# Patient Record
Sex: MALE | Race: WHITE | NOT HISPANIC OR LATINO | Employment: OTHER | URBAN - METROPOLITAN AREA
[De-identification: names, ages, dates, MRNs, and addresses within clinical notes are randomized per-mention and may not be internally consistent; named-entity substitution may affect disease eponyms.]

---

## 2017-01-16 ENCOUNTER — OFFICE VISIT (OUTPATIENT)
Dept: UROLOGY | Facility: CLINIC | Age: 72
End: 2017-01-16
Payer: MEDICARE

## 2017-01-16 VITALS
HEIGHT: 69 IN | DIASTOLIC BLOOD PRESSURE: 85 MMHG | TEMPERATURE: 98 F | WEIGHT: 163 LBS | SYSTOLIC BLOOD PRESSURE: 131 MMHG | HEART RATE: 81 BPM | BODY MASS INDEX: 24.14 KG/M2

## 2017-01-16 DIAGNOSIS — C61 PROSTATE CANCER: ICD-10-CM

## 2017-01-16 PROCEDURE — 1126F AMNT PAIN NOTED NONE PRSNT: CPT | Mod: S$GLB,,, | Performed by: UROLOGY

## 2017-01-16 PROCEDURE — 1160F RVW MEDS BY RX/DR IN RCRD: CPT | Mod: S$GLB,,, | Performed by: UROLOGY

## 2017-01-16 PROCEDURE — 99999 PR PBB SHADOW E&M-EST. PATIENT-LVL III: CPT | Mod: PBBFAC,,, | Performed by: UROLOGY

## 2017-01-16 PROCEDURE — 1157F ADVNC CARE PLAN IN RCRD: CPT | Mod: S$GLB,,, | Performed by: UROLOGY

## 2017-01-16 PROCEDURE — 99214 OFFICE O/P EST MOD 30 MIN: CPT | Mod: 25,S$GLB,, | Performed by: UROLOGY

## 2017-01-16 PROCEDURE — 1159F MED LIST DOCD IN RCRD: CPT | Mod: S$GLB,,, | Performed by: UROLOGY

## 2017-01-16 PROCEDURE — 81002 URINALYSIS NONAUTO W/O SCOPE: CPT | Mod: S$GLB,,, | Performed by: UROLOGY

## 2017-01-16 RX ORDER — MAGNESIUM HYDROXIDE 400 MG/5ML
1 SUSPENSION, ORAL (FINAL DOSE FORM) ORAL WEEKLY
COMMUNITY

## 2017-01-16 NOTE — PROGRESS NOTES
Subjective:       Patient ID: Charles Perez is a 71 y.o. male.    Chief Complaint:   CHIEF COMPLAINT:  Prostate cancer.    Mr. Perez is a 71-year-old male who was diagnosed of having prostate cancer by   Dr. Call with a Sommer score 7.  His initial PSA was 6.1.  His stage was   T1N0M0.  The patient elected to undergo external beam radiation that was   completed by Dr. Devine at [facility] on May 2016.  Since   then, the patient has been taking LHRH-agonist in order to enhance the results   of the external beam radiation.  The patient is here for his followup visit.  He   refers that he is still having hot flashes.  He would like to be discontinued   from the Trelstar.  He also refers to have nocturia x1 to 2, good urinary flow.    No dysuria.  No hematuria.  He feels that he can empty the bladder   satisfactorily.    FAMILY HISTORY:  Negative for prostate cancer.    PAST MEDICAL AND SURGICAL HISTORY:  Well documented in the medical record and   this was reviewed during this visit.    In the review also, the medications and allergies were reviewed.    PSA on April 2016 was 0.3, in September 2016 was 0.1 and in January 2017 is 0.1.    The urinalysis today is within normal limits.      EOR/PN  dd: 01/16/2017 17:23:01 (CST)  td: 01/17/2017 16:25:14 (CST)  Doc ID   #[id number]  Job ID #[id number]    CC:       HPI  Review of Systems   Constitutional: Negative for activity change and appetite change.   HENT: Negative.    Eyes: Negative for discharge.   Respiratory: Negative for cough and shortness of breath.    Cardiovascular: Negative for chest pain and palpitations.   Gastrointestinal: Negative for abdominal distention, abdominal pain, constipation and vomiting.   Genitourinary: Negative for discharge, dysuria, flank pain, frequency, hematuria, testicular pain and urgency.   Musculoskeletal: Negative for arthralgias.   Skin: Negative for rash.   Neurological: Negative for dizziness.    Psychiatric/Behavioral: The patient is not nervous/anxious.        Objective:      Physical Exam   Constitutional: He appears well-developed and well-nourished.   HENT:   Head: Normocephalic.   Eyes: Pupils are equal, round, and reactive to light.   Neck: Normal range of motion.   Cardiovascular: Normal rate.    Pulmonary/Chest: Effort normal.   Abdominal: Soft. He exhibits no distension and no mass. There is no tenderness. Hernia confirmed negative in the right inguinal area and confirmed negative in the left inguinal area.   Genitourinary: Rectum normal, prostate normal and penis normal. Rectal exam shows no external hemorrhoid, no mass and no tenderness. Prostate is not enlarged and not tender. Right testis shows no mass and no tenderness. Left testis shows no mass and no tenderness. No discharge found.   Musculoskeletal: Normal range of motion.   Neurological: He is alert.   Skin: Skin is warm.     Psychiatric: He has a normal mood and affect.       Assessment:       1. Prostate cancer        Plan:       Prostate cancer  -     POCT URINE DIPSTICK WITHOUT MICROSCOPE  -     Prostate Specific Antigen, Diagnostic; Future; Expected date: 7/17/17    No more trelstar . RTC 6 mo with PSA.

## 2017-01-16 NOTE — MR AVS SNAPSHOT
[facility]  [address], [address]  [place] [address]  Phone: [phone]                  Charles Perez   2017 2:00 PM   Office Visit    Description:  Male : 1945   Provider:  Marciano Barboza MD   Department:  [facility]           Reason for Visit     6 mth recheck           Diagnoses this Visit        Comments    Prostate cancer                To Do List           Future Appointments        Provider Department Dept Phone    2017 10:00 AM MD [facility] [phone]      Goals (5 Years of Data)     None      Follow-Up and Disposition     Return in about 6 months (around 2017).      [facility]     [facility] On Call Nurse [facility] -  Assistance  Registered nurses in the [facility] On Call Center provide clinical advisement, health education, appointment booking, and other advisory services.  Call for this free service at [phone].             Medications           Message regarding Medications     Verify the changes and/or additions to your medication regime listed below are the same as discussed with your clinician today.  If any of these changes or additions are incorrect, please notify your healthcare provider.        STOP taking these medications     cyanocobalamin (B-12 DOTS) 500 MCG tablet Take 500 mcg by mouth once daily.             Verify that the below list of medications is an accurate representation of the medications you are currently taking.  If none reported, the list may be blank. If incorrect, please contact your healthcare provider. Carry this list with you in case of emergency.           Current Medications     aspirin (ECOTRIN) 81 MG EC tablet Take 81 mg by mouth once daily.      atorvastatin (LIPITOR) 80 MG tablet Take 80 mg by mouth once daily.     CALCIUM CITRATE/VITAMIN D3 (CALCIUM CITRATE + D ORAL) Take 3 tablets by mouth once daily.    cyanocobalamin, vitamin B-12, 5,000 mcg TbDL  "Take 1 tablet by mouth once a week.    hydrocodone-acetaminophen 5-325mg (NORCO) 5-325 mg per tablet Take 1 tablet by mouth every morning.     IRON/VITAMIN B COMPLEX (GERITOL ORAL) Take 1 capsule by mouth nightly.    levothyroxine (SYNTHROID) 50 MCG tablet Take 50 mcg by mouth before breakfast.     MAGNESIUM ORAL Take 3 tablets by mouth once daily.    megestrol (MEGACE) 20 MG Tab Take 1 tablet (20 mg total) by mouth 2 (two) times daily.    UBIDECARENONE (COQ-10 ORAL) Take 1 tablet by mouth once daily.           Clinical Reference Information           Vital Signs - Last Recorded  Most recent update: 1/16/2017  1:47 PM by Cheri Neri MA    BP Pulse Temp Ht Wt BMI    131/85 (BP Location: Left arm, Patient Position: Sitting, BP Method: Automatic) 81 98.1 °F (36.7 °C) (Oral) 5' 9" (1.753 m) 73.9 kg (163 lb) 24.07 kg/m2      Blood Pressure          Most Recent Value    BP  131/85      Allergies as of 1/16/2017     No Known Drug Allergies      Immunizations Administered on Date of Encounter - 1/16/2017     None      Orders Placed During Today's Visit      Normal Orders This Visit    POCT URINE DIPSTICK WITHOUT MICROSCOPE     Future Labs/Procedures Expected by Expires    Prostate Specific Antigen, Diagnostic  7/17/2017 1/17/2018      [facility] Sign-Up     Activating your [facility] account is as easy as 1-2-3!     1) Visit [email or web address], select Sign Up Now, enter this activation code and your date of birth, then select Next.  Activation code not generated  Current Patient Portal Status: Account disabled      2) Create a username and password to use when you visit [facility] in the future and select a security question in case you lose your password and select Next.    3) Enter your e-mail address and click Sign Up!    Additional Information  If you have questions, please e-mail [email or web address] or call [phone] to talk to our [facility] staff. Remember, [facility] is NOT to be used for urgent needs. For medical " emergencies, dial 911.

## 2017-01-17 LAB
BILIRUB SERPL-MCNC: ABNORMAL MG/DL
BLOOD URINE, POC: ABNORMAL
COLOR, POC UA: ABNORMAL
GLUCOSE UR QL STRIP: ABNORMAL
KETONES UR QL STRIP: ABNORMAL
LEUKOCYTE ESTERASE URINE, POC: ABNORMAL
NITRITE, POC UA: ABNORMAL
PH, POC UA: 5
PROTEIN, POC: ABNORMAL
SPECIFIC GRAVITY, POC UA: 1.02
UROBILINOGEN, POC UA: ABNORMAL

## 2017-01-20 ENCOUNTER — TELEPHONE (OUTPATIENT)
Dept: UROLOGY | Facility: CLINIC | Age: 72
End: 2017-01-20

## 2017-01-20 NOTE — TELEPHONE ENCOUNTER
----- Message from Nina Prater sent at 1/20/2017  3:46 PM CST -----  Contact: Stephanie cortez/[facility] is requesting a call back for a ICD 10 for labs they preformed on 011217 for a PSA, contact [facility] at [phone]. Choose 2 then 1, give reference [id number].   Thank you

## 2017-01-23 ENCOUNTER — TELEPHONE (OUTPATIENT)
Dept: UROLOGY | Facility: CLINIC | Age: 72
End: 2017-01-23

## 2017-01-23 NOTE — TELEPHONE ENCOUNTER
----- Message from Lupe Caballero RT sent at 1/23/2017 11:07 AM CST -----  Contact: Shaq,  [facility] Ref no. [id number]   Shaq,  [facility] Ref no. [id number], requesting clarification on the pt PSA of 01/12/2017, please call, thanks.

## 2017-03-27 ENCOUNTER — OFFICE VISIT (OUTPATIENT)
Dept: UROLOGY | Facility: CLINIC | Age: 72
End: 2017-03-27
Payer: MEDICARE

## 2017-03-27 VITALS
HEIGHT: 69 IN | DIASTOLIC BLOOD PRESSURE: 84 MMHG | TEMPERATURE: 98 F | HEART RATE: 69 BPM | SYSTOLIC BLOOD PRESSURE: 135 MMHG

## 2017-03-27 DIAGNOSIS — C61 PROSTATE CANCER: Primary | ICD-10-CM

## 2017-03-27 DIAGNOSIS — N48.1 BALANITIS: ICD-10-CM

## 2017-03-27 PROCEDURE — 99999 PR PBB SHADOW E&M-EST. PATIENT-LVL III: CPT | Mod: PBBFAC,,, | Performed by: UROLOGY

## 2017-03-27 PROCEDURE — 1157F ADVNC CARE PLAN IN RCRD: CPT | Mod: S$GLB,,, | Performed by: UROLOGY

## 2017-03-27 PROCEDURE — 99214 OFFICE O/P EST MOD 30 MIN: CPT | Mod: S$GLB,,, | Performed by: UROLOGY

## 2017-03-27 PROCEDURE — 1159F MED LIST DOCD IN RCRD: CPT | Mod: S$GLB,,, | Performed by: UROLOGY

## 2017-03-27 PROCEDURE — 1160F RVW MEDS BY RX/DR IN RCRD: CPT | Mod: S$GLB,,, | Performed by: UROLOGY

## 2017-03-27 PROCEDURE — 1126F AMNT PAIN NOTED NONE PRSNT: CPT | Mod: S$GLB,,, | Performed by: UROLOGY

## 2017-03-27 RX ORDER — CLOTRIMAZOLE AND BETAMETHASONE DIPROPIONATE 10; .64 MG/G; MG/G
CREAM TOPICAL DAILY
Qty: 45 G | Refills: 2 | Status: SHIPPED | OUTPATIENT
Start: 2017-03-27 | End: 2017-04-17

## 2017-03-27 RX ORDER — SILDENAFIL 100 MG/1
100 TABLET, FILM COATED ORAL
Qty: 6 TABLET | Refills: 11
Start: 2017-03-27 | End: 2017-08-07 | Stop reason: SDUPTHER

## 2017-03-27 NOTE — PROGRESS NOTES
Subjective:       Patient ID: Charles Perez is a 71 y.o. male.    Chief Complaint:   OFFICE NOTE    CHIEF COMPLAINT:  Severe penile irritation.    Mr. Perez is a 71-year-old male known to have prostate cancer who underwent   external beam radiation completed at [facility] in May 2016.  His   initial PSA was 6.1 and since then, the patient is doing well and his PSA has   been low.  The patient refers that he is having a penile irritation, especially   in the foreskin close to the glans penis.  The patient feels some discomfort   with the irritation.  It is to be noted that he is noncircumcised and the skin   covers more than half of the glans penis.  The patient does not have any   significant phimosis.  Regarding his prostate cancer, he is doing well, and he   is not having any significant issues.  The last visit was in January 2017, and   at that time, the PSA was ordered for July 2017.    The remainder of the medical and surgical history are well documented in the   medical record.  To be noted is that the patient has no history of diabetes, but   he has history of melanoma.  The medications are also well documented and they   were reviewed during this visit.    The patient also complains that he is having increasing difficulty achieving and   maintaining an erection satisfactorily for sexual intercourse.  I explained to   him that this is one of the side effects of having radiotherapy for prostate   cancer and that he probably will respond well to the use of Viagra, and after   explaining to the patient the rationale, the risk and complication of Viagra   therapy he agreed to proceed and take that medication on as-needed basis.      EOR/PN  dd: 03/27/2017 17:57:08 (CDT)  td: 03/28/2017 03:33:54 (CDT)  Doc ID   #[id number]  Job ID #[id number]    CC:       HPI  Review of Systems   Constitutional: Negative for activity change and appetite change.   HENT: Negative.    Eyes: Negative for discharge.    Respiratory: Negative for cough and shortness of breath.    Cardiovascular: Negative for chest pain and palpitations.   Gastrointestinal: Negative for abdominal distention, abdominal pain, constipation and vomiting.   Genitourinary: Negative for discharge, dysuria, flank pain, frequency, hematuria, testicular pain and urgency.   Musculoskeletal: Negative for arthralgias.   Skin: Negative for rash.   Neurological: Negative for dizziness.   Psychiatric/Behavioral: The patient is not nervous/anxious.        Objective:      Physical Exam   Constitutional: He appears well-developed and well-nourished.   HENT:   Head: Normocephalic.   Eyes: Pupils are equal, round, and reactive to light.   Neck: Normal range of motion.   Cardiovascular: Normal rate.    Pulmonary/Chest: Effort normal.   Abdominal: Soft. He exhibits no distension and no mass. There is no tenderness.   Genitourinary: Rectum normal and prostate normal. Right testis shows no mass and no tenderness. Left testis shows no mass and no tenderness. Uncircumcised. Penile erythema present. No discharge found.         Musculoskeletal: Normal range of motion.   Neurological: He is alert.   Skin: Skin is warm.     Psychiatric: He has a normal mood and affect.       Assessment:       1. Prostate cancer    2. Balanitis      ED  Plan:       Prostate cancer    Balanitis    Other orders  -     clotrimazole-betamethasone 1-0.05% (LOTRISONE) cream; Apply topically once daily.  Dispense: 45 g; Refill: 2  -     sildenafil (VIAGRA) 100 MG tablet; Take 1 tablet (100 mg total) by mouth as needed for Erectile Dysfunction.  Dispense: 6 tablet; Refill: 11    RTC 2 mo.

## 2017-03-27 NOTE — MR AVS SNAPSHOT
[facility]  [address], [address]  [place] [address]  Phone: [phone]                  Charles Perez   3/27/2017 8:45 AM   Office Visit    Description:  Male : 1945   Provider:  Marciano Barboza MD   Department:  [facility]           Reason for Visit     irritation with foreskin                To Do List           Future Appointments        Provider Department Dept Phone    2017 10:00 AM MD [facility] [phone]      Goals (5 Years of Data)     None      [facility]     [facility] -  Assistance  Registered nurses in the [facility] Center provide clinical advisement, health education, appointment booking, and other advisory services.  Call for this free service at [phone].             Medications           Message regarding Medications     Verify the changes and/or additions to your medication regime listed below are the same as discussed with your clinician today.  If any of these changes or additions are incorrect, please notify your healthcare provider.             Verify that the below list of medications is an accurate representation of the medications you are currently taking.  If none reported, the list may be blank. If incorrect, please contact your healthcare provider. Carry this list with you in case of emergency.           Current Medications     aspirin (ECOTRIN) 81 MG EC tablet Take 81 mg by mouth once daily.      atorvastatin (LIPITOR) 80 MG tablet Take 80 mg by mouth once daily.     CALCIUM CITRATE/VITAMIN D3 (CALCIUM CITRATE + D ORAL) Take 3 tablets by mouth once daily.    cyanocobalamin, vitamin B-12, 5,000 mcg TbDL Take 1 tablet by mouth once a week.    hydrocodone-acetaminophen 5-325mg (NORCO) 5-325 mg per tablet Take 1 tablet by mouth every morning.     IRON/VITAMIN B COMPLEX (GERITOL ORAL) Take 1 capsule by mouth nightly.    levothyroxine (SYNTHROID) 50 MCG  "tablet Take 50 mcg by mouth before breakfast.     MAGNESIUM ORAL Take 3 tablets by mouth once daily.    UBIDECARENONE (COQ-10 ORAL) Take 1 tablet by mouth once daily.    megestrol (MEGACE) 20 MG Tab Take 1 tablet (20 mg total) by mouth 2 (two) times daily.           Clinical Reference Information           Your Vitals Were     BP Pulse Temp Height          135/84 (BP Location: Right arm, Patient Position: Sitting, BP Method: Automatic) 69 97.9 °F (36.6 °C) (Oral) 5' 9" (1.753 m)        Blood Pressure          Most Recent Value    BP  135/84      Allergies as of 3/27/2017     No Known Drug Allergies      Immunizations Administered on Date of Encounter - 3/27/2017     None      Language Assistance Services     ATTENTION: Language assistance services are available, free of charge. Please call [phone].      ATENCIÓN: Si yarely renee, tiene a antunez disposición servicios gratuitos de asistencia lingüística. Llame al [phone].     CLEMENTINE Ý: N?u b?n nói Ti?ng Vi?t, có các d?ch v? h? tr? ngôn ng? mi?n phí dành cho b?n. G?i s? [phone].         [facility] complies with applicable Federal civil rights laws and does not discriminate on the basis of race, color, national origin, age, disability, or sex.        "

## 2017-04-11 ENCOUNTER — HISTORICAL (OUTPATIENT)
Dept: ADMINISTRATIVE | Facility: HOSPITAL | Age: 72
End: 2017-04-11

## 2017-04-11 LAB — GLUCOSE SERPL-MCNC: 93 MG/DL (ref 70–99)

## 2017-05-01 DIAGNOSIS — R63.0 DECREASED APPETITE: ICD-10-CM

## 2017-05-01 NOTE — TELEPHONE ENCOUNTER
Called in refill of Megace to pharmacy, per Dr. Stephens's written order.   Left message for patient to call back.

## 2017-05-01 NOTE — TELEPHONE ENCOUNTER
----- Message from Clarissa Gaitan sent at 5/1/2017  8:56 AM CDT -----  Patient did not take the shot for his hot flashes last time because doctor said he did not have to. But his hot flashes are keeping him awake at night so they would like office to call him in a prescription of Maegastorl 20 mg to:      [id number] [place] - [address]  [address] [place] [address]  Phone: [phone] Fax: [phone]    Please call patient when completed at [phone].

## 2017-05-05 RX ORDER — MEGESTROL ACETATE 20 MG/1
20 TABLET ORAL 2 TIMES DAILY
Qty: 60 TABLET | Refills: 0 | Status: SHIPPED | OUTPATIENT
Start: 2017-05-05 | End: 2019-10-14

## 2017-08-07 ENCOUNTER — OFFICE VISIT (OUTPATIENT)
Dept: UROLOGY | Facility: CLINIC | Age: 72
End: 2017-08-07
Payer: MEDICARE

## 2017-08-07 VITALS
DIASTOLIC BLOOD PRESSURE: 91 MMHG | WEIGHT: 152 LBS | TEMPERATURE: 98 F | HEART RATE: 71 BPM | HEIGHT: 69 IN | BODY MASS INDEX: 22.51 KG/M2 | SYSTOLIC BLOOD PRESSURE: 149 MMHG

## 2017-08-07 DIAGNOSIS — C61 PROSTATE CANCER: Primary | ICD-10-CM

## 2017-08-07 PROCEDURE — 99999 PR PBB SHADOW E&M-EST. PATIENT-LVL III: CPT | Mod: PBBFAC,,, | Performed by: UROLOGY

## 2017-08-07 PROCEDURE — 99214 OFFICE O/P EST MOD 30 MIN: CPT | Mod: S$GLB,,, | Performed by: UROLOGY

## 2017-08-07 PROCEDURE — 1126F AMNT PAIN NOTED NONE PRSNT: CPT | Mod: S$GLB,,, | Performed by: UROLOGY

## 2017-08-07 PROCEDURE — 1159F MED LIST DOCD IN RCRD: CPT | Mod: S$GLB,,, | Performed by: UROLOGY

## 2017-08-07 RX ORDER — SILDENAFIL 100 MG/1
100 TABLET, FILM COATED ORAL
Qty: 10 TABLET | Refills: 11 | Status: SHIPPED | OUTPATIENT
Start: 2017-08-07 | End: 2019-09-20

## 2017-08-07 NOTE — PROGRESS NOTES
Subjective:       Patient ID: Charles Perez is a 71 y.o. male.    Chief Complaint:  OFFICE NOTE    CHIEF COMPLAINT:  Prostate cancer.    Mr. Perez is a 71-year-old male who underwent external beam radiation to the   prostate that was completed in May 2016.  His initial PSA was 6.1.  He underwent   radiation and since then, the patient's PSA has been very low at 0.1.  The   patient is here for his semiannual evaluation.  He refers that he is not having   any urinary issues, nocturia x1, good urinary flow.  No dysuria.  No hematuria   and he feels that he empties the bladder satisfactory.    The family history is significant.  His older brother  from this condition.    The past medical and the past surgical history have not changed since the last   visit to our office on 2017.  At that time, the patient was treated for   severe balanitis with the medication that he received.  The balanitis was   completely resolved in a couple of days.  The patient denies bone pain or weight   loss.  He is fully functional.    The patient was unable to provide us with a urine test today.  His last PSA was   on 2017, 0.1.      EOR/PN  dd: 2017 13:59:27 (CDT)  td: 2017 18:25:51 (CDT)  Doc ID   #[id number]  Job ID #[id number]    CC:        HPI  Review of Systems   Constitutional: Negative for activity change and appetite change.   HENT: Negative.    Eyes: Negative for discharge.   Respiratory: Negative for cough and shortness of breath.    Cardiovascular: Negative for chest pain and palpitations.   Gastrointestinal: Negative for abdominal distention, abdominal pain, constipation and vomiting.   Genitourinary: Negative for discharge, dysuria, flank pain, frequency, hematuria, testicular pain and urgency.   Musculoskeletal: Negative for arthralgias.   Skin: Negative for rash.   Neurological: Negative for dizziness.   Psychiatric/Behavioral: The patient is not nervous/anxious.        Objective:      Physical  Exam   Constitutional: He appears well-developed and well-nourished.   HENT:   Head: Normocephalic.   Eyes: Pupils are equal, round, and reactive to light.   Neck: Normal range of motion.   Cardiovascular: Normal rate.    Pulmonary/Chest: Effort normal.   Abdominal: Soft. He exhibits no distension and no mass. There is no tenderness.   Genitourinary: Rectum normal, prostate normal and penis normal. Rectal exam shows no external hemorrhoid, no mass and no tenderness. Prostate is not enlarged and not tender. Right testis shows no mass and no tenderness. Left testis shows no mass and no tenderness. No discharge found.   Musculoskeletal: Normal range of motion.   Neurological: He is alert.   Skin: Skin is warm.     Psychiatric: He has a normal mood and affect.       Assessment:       1. Prostate cancer        Plan:       Prostate cancer  -     Prostate Specific Antigen, Diagnostic; Future; Expected date: 02/07/2018    Other orders  -     sildenafil (VIAGRA) 100 MG tablet; Take 1 tablet (100 mg total) by mouth as needed for Erectile Dysfunction.  Dispense: 10 tablet; Refill: 11    Stable PCA after External Beam Radiation RTC 6 mo.

## 2018-02-08 ENCOUNTER — OFFICE VISIT (OUTPATIENT)
Dept: UROLOGY | Facility: CLINIC | Age: 73
End: 2018-02-08
Payer: MEDICARE

## 2018-02-08 VITALS
HEART RATE: 62 BPM | HEIGHT: 69 IN | DIASTOLIC BLOOD PRESSURE: 90 MMHG | WEIGHT: 170.88 LBS | TEMPERATURE: 98 F | BODY MASS INDEX: 25.31 KG/M2 | SYSTOLIC BLOOD PRESSURE: 155 MMHG

## 2018-02-08 DIAGNOSIS — C61 PROSTATE CANCER: Primary | ICD-10-CM

## 2018-02-08 PROCEDURE — 3008F BODY MASS INDEX DOCD: CPT | Mod: S$GLB,,, | Performed by: UROLOGY

## 2018-02-08 PROCEDURE — 99214 OFFICE O/P EST MOD 30 MIN: CPT | Mod: S$GLB,,, | Performed by: UROLOGY

## 2018-02-08 PROCEDURE — 1159F MED LIST DOCD IN RCRD: CPT | Mod: S$GLB,,, | Performed by: UROLOGY

## 2018-02-08 PROCEDURE — 81002 URINALYSIS NONAUTO W/O SCOPE: CPT | Mod: S$GLB,,, | Performed by: UROLOGY

## 2018-02-08 PROCEDURE — 1126F AMNT PAIN NOTED NONE PRSNT: CPT | Mod: S$GLB,,, | Performed by: UROLOGY

## 2018-02-08 PROCEDURE — 99999 PR PBB SHADOW E&M-EST. PATIENT-LVL III: CPT | Mod: PBBFAC,,, | Performed by: UROLOGY

## 2018-02-09 NOTE — PROGRESS NOTES
Subjective:       Patient ID: Charles Perez is a 72 y.o. male.    Chief Complaint:   OFFICE NOTE    CHIEF COMPLAINT:  Prostate cancer.    Mr. Perez is a 72-year-old male who was diagnosed of having prostate cancer and   was treated with radiation therapy completed in May 2016.  His initial PSA was   6.1.  Since then, his PSA has been monitored and it has been stable at 0.1.  The   patient refers that at the present time, he has no complaints with nocturia x1.    No dysuria, no hematuria.  The flow is adequate and he feels that he can empty   the bladder satisfactorily.  The patient denies any difficulty with bowel   movements or bone pain or weight loss.    His family history is significant.  His older brother  of prostate cancer.    The past medical history and past surgical history are well documented in the   medical record and all of this was reviewed by me during this visit including   the medications and allergies that he has.  It is to be noted that the last PSA   was on 2018 less than 0.1.    The urinalysis today is within normal limits.      EOR/HN  dd: 2018 13:12:45 (CST)  td: 02/10/2018 08:29:10 (CST)  Doc ID   #[id number]  Job ID #[id number]    CC:       HPI  Review of Systems   Constitutional: Negative for activity change and appetite change.   HENT: Negative.    Eyes: Negative for discharge.   Respiratory: Negative for cough and shortness of breath.    Cardiovascular: Negative for chest pain and palpitations.   Gastrointestinal: Negative for abdominal distention, abdominal pain, constipation and vomiting.   Genitourinary: Negative for discharge, dysuria, flank pain, frequency, hematuria, testicular pain and urgency.   Musculoskeletal: Negative for arthralgias.   Skin: Negative for rash.   Neurological: Negative for dizziness.   Psychiatric/Behavioral: The patient is not nervous/anxious.        Objective:      Physical Exam   Constitutional: He appears well-developed and well-nourished.    HENT:   Head: Normocephalic.   Eyes: Pupils are equal, round, and reactive to light.   Neck: Normal range of motion.   Cardiovascular: Normal rate.    Pulmonary/Chest: Effort normal.   Abdominal: Soft. He exhibits no distension and no mass. There is no tenderness.   Genitourinary: Rectum normal, prostate normal and penis normal. Rectal exam shows no external hemorrhoid, no mass and no tenderness. Prostate is not enlarged and not tender. Right testis shows no mass and no tenderness. Left testis shows no mass and no tenderness. No discharge found.       Musculoskeletal: Normal range of motion.   Neurological: He is alert.   Skin: Skin is warm.     Psychiatric: He has a normal mood and affect.       Assessment:       1. Prostate cancer        Plan:       Prostate cancer    Stable RTC 6 mo.

## 2018-02-15 LAB
BILIRUB SERPL-MCNC: NEGATIVE MG/DL
BLOOD URINE, POC: NEGATIVE
COLOR, POC UA: NORMAL
GLUCOSE UR QL STRIP: NEGATIVE
KETONES UR QL STRIP: NEGATIVE
LEUKOCYTE ESTERASE URINE, POC: NEGATIVE
NITRITE, POC UA: NEGATIVE
PH, POC UA: 8
PROTEIN, POC: NEGATIVE
SPECIFIC GRAVITY, POC UA: 1
UROBILINOGEN, POC UA: NEGATIVE

## 2018-04-11 ENCOUNTER — PES CALL (OUTPATIENT)
Dept: ADMINISTRATIVE | Facility: CLINIC | Age: 73
End: 2018-04-11

## 2018-04-17 ENCOUNTER — OFFICE VISIT (OUTPATIENT)
Dept: HEMATOLOGY/ONCOLOGY | Facility: CLINIC | Age: 73
End: 2018-04-17
Payer: MEDICARE

## 2018-04-17 VITALS
RESPIRATION RATE: 18 BRPM | HEIGHT: 68 IN | WEIGHT: 176.38 LBS | BODY MASS INDEX: 26.73 KG/M2 | SYSTOLIC BLOOD PRESSURE: 142 MMHG | HEART RATE: 56 BPM | DIASTOLIC BLOOD PRESSURE: 88 MMHG | TEMPERATURE: 98 F

## 2018-04-17 DIAGNOSIS — C43.9 MALIGNANT MELANOMA, UNSPECIFIED SITE: ICD-10-CM

## 2018-04-17 DIAGNOSIS — C61 PROSTATE CANCER: Primary | ICD-10-CM

## 2018-04-17 PROCEDURE — 99214 OFFICE O/P EST MOD 30 MIN: CPT | Mod: ,,, | Performed by: INTERNAL MEDICINE

## 2018-04-17 NOTE — LETTER
April 17, 2018      David Viera MD  [address]  [address]  [place] [address]           [facility]  [address]  [address]  [place] [address]  Phone: [phone]  Fax: [phone]          Patient: Charles Perez   MR Number: [id number]   YOB: 1945   Date of Visit: 4/17/2018       Dear Dr. David Viera:    Thank you for referring Charles Perez to me for evaluation. Attached you will find relevant portions of my assessment and plan of care.    If you have questions, please do not hesitate to call me. I look forward to following Charles Perez along with you.    Sincerely,    Kole Boyle MD    Enclosure  CC:  No Recipients    If you would like to receive this communication electronically, please contact [email or web address] or [phone] to request more information on [employer] Link access.    For providers and/or their staff who would like to refer a patient to [facility], please contact us through our one-stop-shop provider referral line, [facility], at [phone].    If you feel you have received this communication in error or would no longer like to receive these types of communications, please e-mail [email or web address]

## 2018-04-17 NOTE — PROGRESS NOTES
[facility] Hematology/Oncology  PROGRESS NOTE      Subjective:       Patient ID:   NAME: Charles Perez : 1945     72 y.o. male    Referring Doc: Maximiliano  Other Physicians: Pancho Barboza Dugan, Blanco, Hightower    Chief Complaint:  Melanoma f/u    History of Present Illness:     Patient returns today for a regularly scheduled follow-up visit.  The patient is here with his wife. He is still seeing Dr Barboza and the last PSA was good per patient. He denies any CP, SOB, HA's or N/V.             ROS:   GEN: normal without any fever, night sweats or weight loss  HEENT: normal with no HA's, sore throat, stiff neck, changes in vision  CV: normal with no CP, SOB, PND, MILLARD or orthopnea  PULM: normal with no SOB, cough, hemoptysis, sputum or pleuritic pain  GI: normal with no abdominal pain, nausea, vomiting, constipation, diarrhea, melanotic stools, BRBPR, or hematemesis  : normal with no hematuria, dysuria  BREAST: normal with no mass, discharge, pain  SKIN: normal with no rash, erythema, bruising, or swelling    Allergies:  Review of patient's allergies indicates:   Allergen Reactions    No known drug allergies        Medications:    Current Outpatient Prescriptions:     aspirin (ECOTRIN) 81 MG EC tablet, Take 81 mg by mouth once daily.  , Disp: , Rfl:     atorvastatin (LIPITOR) 80 MG tablet, Take 80 mg by mouth once daily. , Disp: , Rfl:     CALCIUM CITRATE/VITAMIN D3 (CALCIUM CITRATE + D ORAL), Take 3 tablets by mouth once daily., Disp: , Rfl:     hydrocodone-acetaminophen 5-325mg (NORCO) 5-325 mg per tablet, Take 1 tablet by mouth every morning. , Disp: , Rfl:     levothyroxine (SYNTHROID) 50 MCG tablet, Take 50 mcg by mouth before breakfast. , Disp: , Rfl:     sildenafil (VIAGRA) 100 MG tablet, Take 1 tablet (100 mg total) by mouth as needed for Erectile Dysfunction., Disp: 10 tablet, Rfl: 11    UBIDECARENONE (COQ-10 ORAL), Take 1 tablet by mouth once daily., Disp: , Rfl:     cyanocobalamin,  "vitamin B-12, 5,000 mcg TbDL, Take 1 tablet by mouth once a week., Disp: , Rfl:     IRON/VITAMIN B COMPLEX (GERITOL ORAL), Take 1 capsule by mouth nightly., Disp: , Rfl:     MAGNESIUM ORAL, Take 2 tablets by mouth once daily. , Disp: , Rfl:     megestrol (MEGACE) 20 MG Tab, Take 1 tablet (20 mg total) by mouth 2 (two) times daily., Disp: 60 tablet, Rfl: 0    PMHx/PSHx Updates:  See patient's last visit with me on 4/18/2017.  See H&P on 4/2/2014        Pathology:  See Vol #1          Objective:     Vitals:  Blood pressure (!) 142/88, pulse (!) 56, temperature 97.5 °F (36.4 °C), resp. rate 18, height 5' 8" (1.727 m), weight 80 kg (176 lb 6.4 oz).    Physical Examination:   GEN: no apparent distress, comfortable; AAOx3  HEAD: atraumatic and normocephalic  EYES: no pallor, no icterus, PERRLA  ENT: OMM, no pharyngeal erythema, external ears WNL; no nasal discharge; no thrush  NECK: no masses, thyroid normal, trachea midline, no LAD/LN's, supple  CV: RRR with no murmur; normal pulse; normal S1 and S2; no pedal edema  CHEST: Normal respiratory effort; CTAB; normal breath sounds; no wheeze or crackles  ABDOM: nontender and nondistended; soft; normal bowel sounds; no rebound/guarding  MUSC/Skeletal: ROM normal; no crepitus; joints normal; no deformities or arthropathy  EXTREM: no clubbing, cyanosis, inflammation or swelling  SKIN: no rashes, lesions, ulcers, petechiae or subcutaneous nodules  : no pearce  NEURO: grossly intact; motor/sensory WNL; AAOx3; no tremors  PSYCH: normal mood, affect and behavior  LYMPH: normal cervical, supraclavicular, axillary and groin LN's            Labs:    need latest labs from Dr Viera        Radiology/Diagnostic Studies:    No results found.    I have reviewed all available lab results and radiology reports.    Assessment/Plan:   (1) 72 y.o. male with diagnosis of melanoma involving the LUE in past  - s/p 12 months of interferon therapy in past  - he was a stage III  - last PET was " April 2017      (2) Prior pulmonary nodules - followed by Dr William in past; prior EBUS which was negative    (3) Prostate CA - followed by Dr Barboza; off Lupron now        PLAN:  1. F/u with PCP,  and Derm  2. Schedule yearly PET scan  3. Check up to date labs  4. RTC in 12 months  5. Need latest labs from Dr Viera  Fax note to David Viera MD, Pancho Barboza    Discussion:     I have explained all of the above in detail and the patient understands all of the current recommendation(s). I have answered all of their questions to the best of my ability and to their complete satisfaction.   The patient is to continue with the current management plan.            Electronically signed by Kole Boyle MD

## 2018-04-30 LAB — GLUCOSE SERPL-MCNC: 106 MG/DL (ref 70–99)

## 2018-05-01 ENCOUNTER — TELEPHONE (OUTPATIENT)
Dept: HEMATOLOGY/ONCOLOGY | Facility: CLINIC | Age: 73
End: 2018-05-01

## 2018-05-01 NOTE — TELEPHONE ENCOUNTER
----- Message from Kole Boyle MD sent at 4/30/2018  2:23 PM CDT -----  He has some LN's again that could just be reactive; he will need a repeat CT in 3 months and I would like him to go see his pulmonologist

## 2018-05-23 ENCOUNTER — TELEPHONE (OUTPATIENT)
Dept: HEMATOLOGY/ONCOLOGY | Facility: CLINIC | Age: 73
End: 2018-05-23

## 2018-06-15 ENCOUNTER — PES CALL (OUTPATIENT)
Dept: ADMINISTRATIVE | Facility: CLINIC | Age: 73
End: 2018-06-15

## 2018-07-18 ENCOUNTER — PES CALL (OUTPATIENT)
Dept: ADMINISTRATIVE | Facility: CLINIC | Age: 73
End: 2018-07-18

## 2018-08-09 ENCOUNTER — OFFICE VISIT (OUTPATIENT)
Dept: UROLOGY | Facility: CLINIC | Age: 73
End: 2018-08-09
Payer: MEDICARE

## 2018-08-09 VITALS
HEART RATE: 69 BPM | BODY MASS INDEX: 26.67 KG/M2 | HEIGHT: 68 IN | SYSTOLIC BLOOD PRESSURE: 141 MMHG | WEIGHT: 176 LBS | DIASTOLIC BLOOD PRESSURE: 88 MMHG

## 2018-08-09 DIAGNOSIS — C61 PROSTATE CANCER: Primary | ICD-10-CM

## 2018-08-09 LAB
BILIRUB SERPL-MCNC: NEGATIVE MG/DL
BLOOD URINE, POC: NEGATIVE
COLOR, POC UA: NORMAL
GLUCOSE UR QL STRIP: NEGATIVE
KETONES UR QL STRIP: NEGATIVE
LEUKOCYTE ESTERASE URINE, POC: NEGATIVE
NITRITE, POC UA: NEGATIVE
PH, POC UA: 5
PROTEIN, POC: NEGATIVE
SPECIFIC GRAVITY, POC UA: 1015
UROBILINOGEN, POC UA: NEGATIVE

## 2018-08-09 PROCEDURE — 99999 PR PBB SHADOW E&M-EST. PATIENT-LVL III: CPT | Mod: PBBFAC,,, | Performed by: UROLOGY

## 2018-08-09 PROCEDURE — 99214 OFFICE O/P EST MOD 30 MIN: CPT | Mod: 25,S$GLB,, | Performed by: UROLOGY

## 2018-08-09 PROCEDURE — 81002 URINALYSIS NONAUTO W/O SCOPE: CPT | Mod: S$GLB,,, | Performed by: UROLOGY

## 2018-08-09 NOTE — PROGRESS NOTES
Subjective:       Patient ID: Charles Perez is a 72 y.o. male.    Chief Complaint:   OFFICE NOTE    CHIEF COMPLAINT:  Prostate cancer.    Mr. Perez is a 72-year-old male who was diagnosed of having prostate cancer in   2016.  He underwent external beam radiation that was completed in May of that   year.  His initial PSA was 6.1.  Since then, the PSA has been monitored and has   been stable and less than 0.1.  The last PSA a week ago was less than 0.1.    The patient referred to have nocturia x1.  No dysuria.  No hematuria.  The flow   is adequate and he feels that he empties the bladder satisfactorily.  Denies any   problem with bowel movements, bone pain, or weight loss.    The family history is negative for prostate cancer.    The past medical and surgical histories are well documented in the medical   record and all this was reviewed by me during this visit and have no changes.    The urinalysis is completely clear.      EOR/HN  dd: 08/09/2018 11:52:12 (CDT)  td: 08/10/2018 05:17:39 (CDT)  Doc ID   #[id number]  Job ID #[id number]    CC:       HPI  Review of Systems   Constitutional: Negative for activity change and appetite change.   HENT: Negative.    Eyes: Negative for discharge.   Respiratory: Negative for cough and shortness of breath.    Cardiovascular: Negative for chest pain and palpitations.   Gastrointestinal: Negative for abdominal distention, abdominal pain, constipation and vomiting.   Genitourinary: Negative for discharge, dysuria, flank pain, frequency, hematuria, testicular pain and urgency.   Musculoskeletal: Negative for arthralgias.   Skin: Negative for rash.   Neurological: Negative for dizziness.   Psychiatric/Behavioral: The patient is not nervous/anxious.        Objective:      Physical Exam   Constitutional: He appears well-developed and well-nourished.   HENT:   Head: Normocephalic.   Eyes: Pupils are equal, round, and reactive to light.   Neck: Normal range of motion.   Cardiovascular:  Normal rate.    Pulmonary/Chest: Effort normal.   Abdominal: Soft. He exhibits no distension and no mass. There is no tenderness.   Genitourinary: Rectum normal, prostate normal and penis normal. Rectal exam shows no external hemorrhoid, no mass and no tenderness. Prostate is not enlarged and not tender. Right testis shows no mass and no tenderness. Left testis shows no mass and no tenderness. No discharge found.   Musculoskeletal: Normal range of motion.   Neurological: He is alert.   Skin: Skin is warm.     Psychiatric: He has a normal mood and affect.       Assessment:       1. Prostate cancer        Plan:       Prostate cancer    Other orders  -     POCT urine dipstick without microscope    Stable patient. RTC in 6 mo with repeat PSA.

## 2019-02-14 ENCOUNTER — TELEPHONE (OUTPATIENT)
Dept: UROLOGY | Facility: CLINIC | Age: 74
End: 2019-02-14

## 2019-02-14 NOTE — TELEPHONE ENCOUNTER
Tried calling pt back, no answer, left voicemail letting pt know that we will need to leonard tomorrow's appt due to dr. Barboza not being in the office. Left callback number

## 2019-02-14 NOTE — TELEPHONE ENCOUNTER
----- Message from Sloan Mcintosh sent at 2/14/2019  4:21 PM CST -----  Type:  Patient Call Back    Who Called:pt wife ella     Does the patient know what this is regarding?: reschedule appointment; wants the office to reschedule.  Best Call Back Number: [phone]   Additional Information:  Please call pt and leave a detailed message if there is no answer.

## 2019-02-15 ENCOUNTER — TELEPHONE (OUTPATIENT)
Dept: UROLOGY | Facility: CLINIC | Age: 74
End: 2019-02-15

## 2019-02-15 NOTE — TELEPHONE ENCOUNTER
Spoke with patient, rescheduled at appt for 2/27 @ 230pm.      ----- Message from Rosy Wood sent at 2/15/2019  9:34 AM CST -----  Contact: Pt  ..Type:  Patient Returning Call    Who Called: pt  Who Left Message for Patient: Celeste  Does the patient know what this is regarding?:  Reschedule appt, pt would like to reschedule with nurse  Best Call Back Number:    Additional Information:  Please call to advise

## 2019-02-27 ENCOUNTER — OFFICE VISIT (OUTPATIENT)
Dept: UROLOGY | Facility: CLINIC | Age: 74
End: 2019-02-27
Payer: MEDICARE

## 2019-02-27 ENCOUNTER — TELEPHONE (OUTPATIENT)
Dept: UROLOGY | Facility: CLINIC | Age: 74
End: 2019-02-27

## 2019-02-27 VITALS
DIASTOLIC BLOOD PRESSURE: 88 MMHG | BODY MASS INDEX: 26.22 KG/M2 | WEIGHT: 177 LBS | HEART RATE: 93 BPM | TEMPERATURE: 98 F | HEIGHT: 69 IN | SYSTOLIC BLOOD PRESSURE: 127 MMHG

## 2019-02-27 DIAGNOSIS — C61 PROSTATE CANCER: Primary | ICD-10-CM

## 2019-02-27 PROCEDURE — 99999 PR PBB SHADOW E&M-EST. PATIENT-LVL III: ICD-10-PCS | Mod: PBBFAC,HCWC,, | Performed by: UROLOGY

## 2019-02-27 PROCEDURE — 81002 POCT URINE DIPSTICK WITHOUT MICROSCOPE: ICD-10-PCS | Mod: HCWC,S$GLB,, | Performed by: UROLOGY

## 2019-02-27 PROCEDURE — 81002 URINALYSIS NONAUTO W/O SCOPE: CPT | Mod: HCWC,S$GLB,, | Performed by: UROLOGY

## 2019-02-27 PROCEDURE — 1101F PT FALLS ASSESS-DOCD LE1/YR: CPT | Mod: HCWC,CPTII,S$GLB, | Performed by: UROLOGY

## 2019-02-27 PROCEDURE — 1101F PR PT FALLS ASSESS DOC 0-1 FALLS W/OUT INJ PAST YR: ICD-10-PCS | Mod: HCWC,CPTII,S$GLB, | Performed by: UROLOGY

## 2019-02-27 PROCEDURE — 99214 PR OFFICE/OUTPT VISIT, EST, LEVL IV, 30-39 MIN: ICD-10-PCS | Mod: 25,HCWC,S$GLB, | Performed by: UROLOGY

## 2019-02-27 PROCEDURE — 99999 PR PBB SHADOW E&M-EST. PATIENT-LVL III: CPT | Mod: PBBFAC,HCWC,, | Performed by: UROLOGY

## 2019-02-27 PROCEDURE — 99214 OFFICE O/P EST MOD 30 MIN: CPT | Mod: 25,HCWC,S$GLB, | Performed by: UROLOGY

## 2019-02-27 NOTE — TELEPHONE ENCOUNTER
----- Message from Karrie Webster sent at 2/27/2019 11:16 AM CST -----  Contact: Patient  Type:  Patient Returning Call    Who Called:    Who Left Message for Patient: Julianna  Does the patient know what this is regarding?: appt  Best Call Back Number: [phone]  Additional Information:Patient is calling to see why his appt is being rescheduled again.Please advise.

## 2019-02-27 NOTE — PROGRESS NOTES
Subjective:       Patient ID: Charles Perez is a 73 y.o. male.    Chief Complaint:  DATE OF VISIT:  02/27/2019    CHIEF COMPLAINT:  Prostate cancer.    Mr. Perez is a 73-year-old male who has a history of prostate cancer  treated with external beam radiation in 2016.  His initial PSA was 6.1 and  since then, the PSA has been less than 0.1.  The patient is here for his  semiannual evaluation.  He refers that he is doing well with nocturia x1.   No dysuria.  No hematuria.  The flow is adequate and he feels that he  empties the bladder satisfactory.    The past medical and surgical history are well documented in the medical  record and all this was reviewed by me during this visit including  medications and allergies.  The patient denies bone pain.  The patient  denied weight loss.  The patient denies any difficulty with bowel  movements.    FAMILY HISTORY:  Negative for prostate cancer.    The urinalysis is normal.  The PSA done in 02/2019 is less than 0.01.        EOR/HN dd: 02/27/2019 16:56:51 (CST)   td: 02/28/2019 01:54:21 (CST)  Doc ID #[id number]   Job ID #[id number]    CC:             HPI  Review of Systems   Constitutional: Negative for activity change and appetite change.   HENT: Negative.    Eyes: Negative for discharge.   Respiratory: Negative for cough and shortness of breath.    Cardiovascular: Negative for chest pain and palpitations.   Gastrointestinal: Negative for abdominal distention, abdominal pain, constipation and vomiting.   Genitourinary: Negative for discharge, dysuria, flank pain, frequency, hematuria, testicular pain and urgency.   Musculoskeletal: Negative for arthralgias.   Skin: Negative for rash.   Neurological: Negative for dizziness.   Psychiatric/Behavioral: The patient is not nervous/anxious.        Objective:      Physical Exam   Constitutional: He appears well-developed and well-nourished.   HENT:   Head: Normocephalic.   Eyes: Pupils are equal, round, and reactive to light.    Neck: Normal range of motion.   Cardiovascular: Normal rate.    Pulmonary/Chest: Effort normal.   Abdominal: Soft. He exhibits no distension and no mass. There is no tenderness.   Genitourinary: Rectum normal, prostate normal and penis normal. Rectal exam shows no external hemorrhoid, no mass and no tenderness. Prostate is not enlarged and not tender. Right testis shows no mass and no tenderness. Left testis shows no mass and no tenderness. No discharge found.       Musculoskeletal: Normal range of motion.   Neurological: He is alert.   Skin: Skin is warm.     Psychiatric: He has a normal mood and affect.       Assessment:       1. Prostate cancer        Plan:       Prostate cancer    Other orders  -     POCT URINE DIPSTICK WITHOUT MICROSCOPE    Stable PCA after external beam radiation. RTC in 6 mo with PSA

## 2019-03-26 ENCOUNTER — TELEPHONE (OUTPATIENT)
Dept: HEMATOLOGY/ONCOLOGY | Facility: CLINIC | Age: 74
End: 2019-03-26

## 2019-03-26 DIAGNOSIS — C43.9 MALIGNANT MELANOMA, UNSPECIFIED SITE: ICD-10-CM

## 2019-03-26 DIAGNOSIS — C61 PROSTATE CANCER: Primary | ICD-10-CM

## 2019-03-26 NOTE — TELEPHONE ENCOUNTER
PET scan orders sent in     ----- Message from Jeanette Thompson sent at 3/26/2019  9:38 AM CDT -----  Need Yearly PET scan orders for [facility]  If Dr. CA wants (notes reflect yearly)    # [phone]  Or [phone]

## 2019-03-26 NOTE — TELEPHONE ENCOUNTER
Going to reschedule to a Friday     ----- Message from Jeanette Thompson sent at 3/26/2019  9:39 AM CDT -----  Pt spouse would like to go with him to appt, but that requires him to be scheduled on a Friday.  Can he be scheduled on a Friday? (They have a request to see if they need a PET done as well) if not, leave appt as is.  She just wanted to go with spouse.    CB# [phone]  Or [phone]

## 2019-04-10 ENCOUNTER — TELEPHONE (OUTPATIENT)
Dept: HEMATOLOGY/ONCOLOGY | Facility: CLINIC | Age: 74
End: 2019-04-10

## 2019-04-10 DIAGNOSIS — C61 PROSTATE CANCER: ICD-10-CM

## 2019-04-10 DIAGNOSIS — C43.9 MALIGNANT MELANOMA, UNSPECIFIED SITE: Primary | ICD-10-CM

## 2019-04-10 NOTE — TELEPHONE ENCOUNTER
----- Message from Nina Tovar sent at 4/10/2019 11:19 AM CDT -----  Contact: Xiomara Christian  The patient is scheduled to have a PET scan on Friday for whole body melanoma scan. The orders say skull to mid thigh. They need a new order to read whole body melanoma scan. They will also need a new authorization code because it also says skull to mid thigh. The correct CPT code for the scan is 02624.[facility].

## 2019-04-15 ENCOUNTER — TELEPHONE (OUTPATIENT)
Dept: HEMATOLOGY/ONCOLOGY | Facility: CLINIC | Age: 74
End: 2019-04-15

## 2019-04-15 NOTE — TELEPHONE ENCOUNTER
Coming in next week can get results then unless he calls     ----- Message from Kole Boyle MD sent at 4/12/2019  3:45 PM CDT -----  Call him with the good report

## 2019-04-25 NOTE — PROGRESS NOTES
[facility] Hematology/Oncology  PROGRESS NOTE      Subjective:       Patient ID:   NAME: Charles Perez : 1945     73 y.o. male    Referring Doc: Maximiliano  Other Physicians: Pancho Barboza Dugan, Blanco, Hightower    Chief Complaint:  Melanoma f/u    History of Present Illness:     Patient returns today for a regularly scheduled follow-up visit.  The patient is here with his wife. He follows-up with Dr Barboza and the last PSA was good per patient. He denies any CP, SOB, HA's or N/V. He was last seen in 2018. He has PET scan on 2019 and is here to go over the results.   He saw Dr Barboza recently on 3/7/2019.           ROS:   GEN: normal without any fever, night sweats or weight loss  HEENT: normal with no HA's, sore throat, stiff neck, changes in vision  CV: normal with no CP, SOB, PND, MILLARD or orthopnea  PULM: normal with no SOB, cough, hemoptysis, sputum or pleuritic pain  GI: normal with no abdominal pain, nausea, vomiting, constipation, diarrhea, melanotic stools, BRBPR, or hematemesis  : normal with no hematuria, dysuria  BREAST: normal with no mass, discharge, pain  SKIN: normal with no rash, erythema, bruising, or swelling    Allergies:  Review of patient's allergies indicates:   Allergen Reactions    No known drug allergies        Medications:    Current Outpatient Medications:     aspirin (ECOTRIN) 81 MG EC tablet, Take 81 mg by mouth once daily.  , Disp: , Rfl:     atorvastatin (LIPITOR) 80 MG tablet, Take 80 mg by mouth once daily. , Disp: , Rfl:     CALCIUM CITRATE/VITAMIN D3 (CALCIUM CITRATE + D ORAL), Take 3 tablets by mouth once daily., Disp: , Rfl:     cyanocobalamin, vitamin B-12, 5,000 mcg TbDL, Take 1 tablet by mouth once a week., Disp: , Rfl:     hydrocodone-acetaminophen 5-325mg (NORCO) 5-325 mg per tablet, Take 1 tablet by mouth every morning. , Disp: , Rfl:     IRON/VITAMIN B COMPLEX (GERITOL ORAL), Take 1 capsule by mouth nightly., Disp: , Rfl:     levothyroxine  (SYNTHROID) 50 MCG tablet, Take 50 mcg by mouth before breakfast. , Disp: , Rfl:     MAGNESIUM ORAL, Take 2 tablets by mouth once daily. , Disp: , Rfl:     megestrol (MEGACE) 20 MG Tab, Take 1 tablet (20 mg total) by mouth 2 (two) times daily., Disp: 60 tablet, Rfl: 0    UBIDECARENONE (COQ-10 ORAL), Take 1 tablet by mouth once daily., Disp: , Rfl:     sildenafil (VIAGRA) 100 MG tablet, Take 1 tablet (100 mg total) by mouth as needed for Erectile Dysfunction., Disp: 10 tablet, Rfl: 11    PMHx/PSHx Updates:  See patient's last visit with me on 4/17/2018.  See H&P on 4/2/2014        Pathology:  See Vol #1          Objective:     Vitals:  Blood pressure 136/82, pulse 60, temperature 97.9 °F (36.6 °C), temperature source Oral, resp. rate 20, weight 84.7 kg (186 lb 11.2 oz).    Physical Examination:   GEN: no apparent distress, comfortable; AAOx3  HEAD: atraumatic and normocephalic  EYES: no pallor, no icterus, PERRLA  ENT: OMM, no pharyngeal erythema, external ears WNL; no nasal discharge; no thrush  NECK: no masses, thyroid normal, trachea midline, no LAD/LN's, supple  CV: RRR with no murmur; normal pulse; normal S1 and S2; no pedal edema  CHEST: Normal respiratory effort; CTAB; normal breath sounds; no wheeze or crackles  ABDOM: nontender and nondistended; soft; normal bowel sounds; no rebound/guarding  MUSC/Skeletal: ROM normal; no crepitus; joints normal; no deformities or arthropathy  EXTREM: no clubbing, cyanosis, inflammation or swelling  SKIN: no rashes, lesions, ulcers, petechiae or subcutaneous nodules  : no pearce  NEURO: grossly intact; motor/sensory WNL; AAOx3; no tremors  PSYCH: normal mood, affect and behavior  LYMPH: normal cervical, supraclavicular, axillary and groin LN's            Labs:   2/5/2019 on chart        Radiology/Diagnostic Studies:    PET Scan  4/12/2019:    IMPRESSION:  There is no evidence of metastasis and no unfavorable change from previous CT and PET scans            I have  reviewed all available lab results and radiology reports.    Assessment/Plan:   (1) 73 y.o. male with diagnosis of melanoma involving the LUE in past  - s/p 12 months of interferon therapy in past  - he was a stage III  - latest PET was April 12th 2019 and looked adequate    (2) Prior pulmonary nodules - followed by Dr William in past; prior EBUS which was negative    (3) Prostate CA - followed by Dr Barboza; off Lupron now  - latest PSA was <0.1        PLAN:  1. F/u with PCP,  and Derm  2. Schedule yearly PET scan  3. Check labs yearly at least  4. RTC in 12 months    Fax note to David Viera MD, Pancho Barboza    Discussion:     I have explained all of the above in detail and the patient understands all of the current recommendation(s). I have answered all of their questions to the best of my ability and to their complete satisfaction.   The patient is to continue with the current management plan.            Electronically signed by Kole Boyle MD

## 2019-04-26 ENCOUNTER — OFFICE VISIT (OUTPATIENT)
Dept: HEMATOLOGY/ONCOLOGY | Facility: CLINIC | Age: 74
End: 2019-04-26
Payer: MEDICARE

## 2019-04-26 VITALS
DIASTOLIC BLOOD PRESSURE: 82 MMHG | WEIGHT: 186.69 LBS | HEART RATE: 60 BPM | SYSTOLIC BLOOD PRESSURE: 136 MMHG | RESPIRATION RATE: 20 BRPM | TEMPERATURE: 98 F | BODY MASS INDEX: 27.57 KG/M2

## 2019-04-26 DIAGNOSIS — C43.9 MALIGNANT MELANOMA, UNSPECIFIED SITE: Primary | ICD-10-CM

## 2019-04-26 DIAGNOSIS — C61 PROSTATE CANCER: ICD-10-CM

## 2019-04-26 PROCEDURE — 99214 PR OFFICE/OUTPT VISIT, EST, LEVL IV, 30-39 MIN: ICD-10-PCS | Mod: ,,, | Performed by: INTERNAL MEDICINE

## 2019-04-26 PROCEDURE — 99214 OFFICE O/P EST MOD 30 MIN: CPT | Mod: ,,, | Performed by: INTERNAL MEDICINE

## 2019-04-26 PROCEDURE — 1101F PT FALLS ASSESS-DOCD LE1/YR: CPT | Mod: ,,, | Performed by: INTERNAL MEDICINE

## 2019-04-26 PROCEDURE — 1101F PR PT FALLS ASSESS DOC 0-1 FALLS W/OUT INJ PAST YR: ICD-10-PCS | Mod: ,,, | Performed by: INTERNAL MEDICINE

## 2019-04-26 NOTE — LETTER
April 26, 2019      David Viera MD  [address]  [address]  [facility]  [place] [address]           [facility] - Hematology Oncology  [address]  [address]  [place] [address]  Phone: [phone]  Fax: [phone]          Patient: Charles Perez   MR Number: [id number]   YOB: 1945   Date of Visit: 4/26/2019       Dear Dr. David Viera:    Thank you for referring Charles Perez to me for evaluation. Attached you will find relevant portions of my assessment and plan of care.    If you have questions, please do not hesitate to call me. I look forward to following Charles Perez along with you.    Sincerely,    Kole Boyle MD    Enclosure  CC:  No Recipients    If you would like to receive this communication electronically, please contact [email or web address] or [phone] to request more information on [facility] Link access.    For providers and/or their staff who would like to refer a patient to [facility], please contact us through our one-stop-shop provider referral line, [facility], at [phone].    If you feel you have received this communication in error or would no longer like to receive these types of communications, please e-mail [email or web address]

## 2019-07-01 ENCOUNTER — PES CALL (OUTPATIENT)
Dept: ADMINISTRATIVE | Facility: CLINIC | Age: 74
End: 2019-07-01

## 2019-09-17 LAB — PSA SERPL-MCNC: <0.1 NG/ML (ref 0–4)

## 2019-09-20 ENCOUNTER — OFFICE VISIT (OUTPATIENT)
Dept: UROLOGY | Facility: CLINIC | Age: 74
End: 2019-09-20
Payer: MEDICARE

## 2019-09-20 VITALS
BODY MASS INDEX: 27.74 KG/M2 | DIASTOLIC BLOOD PRESSURE: 114 MMHG | HEART RATE: 64 BPM | RESPIRATION RATE: 16 BRPM | HEIGHT: 68 IN | TEMPERATURE: 98 F | WEIGHT: 183 LBS | SYSTOLIC BLOOD PRESSURE: 158 MMHG

## 2019-09-20 DIAGNOSIS — C61 PROSTATE CANCER: Primary | ICD-10-CM

## 2019-09-20 PROCEDURE — 99999 PR PBB SHADOW E&M-EST. PATIENT-LVL III: ICD-10-PCS | Mod: PBBFAC,HCWC,, | Performed by: UROLOGY

## 2019-09-20 PROCEDURE — 99214 OFFICE O/P EST MOD 30 MIN: CPT | Mod: HCWC,S$GLB,, | Performed by: UROLOGY

## 2019-09-20 PROCEDURE — 99999 PR PBB SHADOW E&M-EST. PATIENT-LVL III: CPT | Mod: PBBFAC,HCWC,, | Performed by: UROLOGY

## 2019-09-20 PROCEDURE — 99214 PR OFFICE/OUTPT VISIT, EST, LEVL IV, 30-39 MIN: ICD-10-PCS | Mod: HCWC,S$GLB,, | Performed by: UROLOGY

## 2019-09-20 PROCEDURE — 1101F PT FALLS ASSESS-DOCD LE1/YR: CPT | Mod: HCWC,CPTII,S$GLB, | Performed by: UROLOGY

## 2019-09-20 PROCEDURE — 1101F PR PT FALLS ASSESS DOC 0-1 FALLS W/OUT INJ PAST YR: ICD-10-PCS | Mod: HCWC,CPTII,S$GLB, | Performed by: UROLOGY

## 2019-09-20 NOTE — PROGRESS NOTES
Subjective:       Patient ID: Charles Perez is a 73 y.o. male.    Chief Complaint:   CHIEF COMPLAINT:  Prostate cancer.    Mr. Perez is a 73-year-old male who has a history of prostate cancer  treated with external beam radiation in 2016.  His initial PSA was 6.1 and  since then, the PSA has been less than 0.1.  The patient is here for his  semiannual evaluation.  The patient referred that he is doing well with  nocturia x1.  There is no dysuria or hematuria.  The flow is adequate and  he feels that he can empty the bladder satisfactorily.    Family history, his brother suffered of prostate cancer and he  from  that condition.    The past medical and surgical history is well documented in the medical  record.  There have not been any changes in the last 6 months including  medications and allergies.  All this was reviewed by me during this visit.   The last PSA was on 2019, at 0.1.  The urinalysis today was not done  as the patient was unable to give us a specimen.        EOR/IN dd: 2019 12:01:17 (CDT)   td: 2019 16:20:06 (CDT)  Doc ID #[id number]   Job ID #[id number]    CC:            HPI  Review of Systems   Constitutional: Negative for activity change and appetite change.   HENT: Negative.    Eyes: Negative for discharge.   Respiratory: Negative for cough and shortness of breath.    Cardiovascular: Negative for chest pain and palpitations.   Gastrointestinal: Negative for abdominal distention, abdominal pain, constipation and vomiting.   Genitourinary: Negative for discharge, dysuria, flank pain, frequency, hematuria, testicular pain and urgency.   Musculoskeletal: Negative for arthralgias.   Skin: Negative for rash.   Neurological: Negative for dizziness.   Psychiatric/Behavioral: The patient is not nervous/anxious.        Objective:      Physical Exam   Constitutional: He appears well-developed and well-nourished.   HENT:   Head: Normocephalic.   Eyes: Pupils are equal, round, and  reactive to light.   Neck: Normal range of motion.   Cardiovascular: Normal rate.    Pulmonary/Chest: Effort normal.   Abdominal: Soft. He exhibits no distension and no mass. There is no tenderness.   Genitourinary: Rectum normal, prostate normal and penis normal. Rectal exam shows no external hemorrhoid, no mass and no tenderness. Prostate is not enlarged and not tender. Right testis shows no mass and no tenderness. Left testis shows no mass and no tenderness. No discharge found.       Musculoskeletal: Normal range of motion.   Neurological: He is alert.   Skin: Skin is warm.     Psychiatric: He has a normal mood and affect.       Assessment:       1. Prostate cancer        Plan:       Prostate cancer    Stable prostate CA after radiation treatment 3 yrs ago. RTC in 6 mo.

## 2019-10-14 ENCOUNTER — OFFICE VISIT (OUTPATIENT)
Dept: FAMILY MEDICINE | Facility: CLINIC | Age: 74
End: 2019-10-14
Payer: MEDICARE

## 2019-10-14 VITALS
BODY MASS INDEX: 28.34 KG/M2 | SYSTOLIC BLOOD PRESSURE: 136 MMHG | HEART RATE: 60 BPM | WEIGHT: 187 LBS | HEIGHT: 68 IN | DIASTOLIC BLOOD PRESSURE: 86 MMHG

## 2019-10-14 DIAGNOSIS — E78.00 HYPERCHOLESTEREMIA: Primary | ICD-10-CM

## 2019-10-14 DIAGNOSIS — M17.0 PRIMARY OSTEOARTHRITIS OF BOTH KNEES: ICD-10-CM

## 2019-10-14 DIAGNOSIS — C43.62 MALIGNANT MELANOMA OF LEFT UPPER EXTREMITY INCLUDING SHOULDER: ICD-10-CM

## 2019-10-14 DIAGNOSIS — Z12.11 SPECIAL SCREENING FOR MALIGNANT NEOPLASMS, COLON: ICD-10-CM

## 2019-10-14 DIAGNOSIS — E03.9 ACQUIRED HYPOTHYROIDISM: ICD-10-CM

## 2019-10-14 DIAGNOSIS — C61 PROSTATE CANCER: ICD-10-CM

## 2019-10-14 DIAGNOSIS — I63.9 CEREBRAL INFARCTION, UNSPECIFIED MECHANISM: ICD-10-CM

## 2019-10-14 PROCEDURE — 99214 OFFICE O/P EST MOD 30 MIN: CPT | Mod: S$GLB,,, | Performed by: FAMILY MEDICINE

## 2019-10-14 PROCEDURE — 99214 PR OFFICE/OUTPT VISIT, EST, LEVL IV, 30-39 MIN: ICD-10-PCS | Mod: S$GLB,,, | Performed by: FAMILY MEDICINE

## 2019-10-14 PROCEDURE — 3288F FALL RISK ASSESSMENT DOCD: CPT | Mod: S$GLB,,, | Performed by: FAMILY MEDICINE

## 2019-10-14 PROCEDURE — 1100F PTFALLS ASSESS-DOCD GE2>/YR: CPT | Mod: S$GLB,,, | Performed by: FAMILY MEDICINE

## 2019-10-14 PROCEDURE — 1100F PR PT FALLS ASSESS DOC 2+ FALLS/FALL W/INJURY/YR: ICD-10-PCS | Mod: S$GLB,,, | Performed by: FAMILY MEDICINE

## 2019-10-14 PROCEDURE — 3288F PR FALLS RISK ASSESSMENT DOCUMENTED: ICD-10-PCS | Mod: S$GLB,,, | Performed by: FAMILY MEDICINE

## 2019-10-14 RX ORDER — ATORVASTATIN CALCIUM 80 MG/1
80 TABLET, FILM COATED ORAL DAILY
Qty: 90 TABLET | Refills: 1 | Status: SHIPPED | OUTPATIENT
Start: 2019-10-14 | End: 2020-03-24 | Stop reason: SDUPTHER

## 2019-10-14 RX ORDER — HYDROCODONE BITARTRATE AND ACETAMINOPHEN 5; 325 MG/1; MG/1
1 TABLET ORAL EVERY MORNING
Qty: 60 TABLET | Refills: 0 | Status: SHIPPED | OUTPATIENT
Start: 2019-10-14 | End: 2020-01-07 | Stop reason: SDUPTHER

## 2019-10-14 RX ORDER — LEVOTHYROXINE SODIUM 50 UG/1
50 TABLET ORAL
Qty: 90 TABLET | Refills: 1 | Status: SHIPPED | OUTPATIENT
Start: 2019-10-14 | End: 2020-03-24 | Stop reason: SDUPTHER

## 2019-10-14 NOTE — PROGRESS NOTES
SUBJECTIVE:    Patient ID: Charles Perez is a 73 y.o. male.    Chief Complaint: Follow-up (did not bring bottles, states he does not need refills ac )    This 73-year-old male has been doing well lately.  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  He lives his wife do the mowing however.    He follows up with Dr. Barboza urology for his prostate cancer situation is PSA is now less than 0.1.  He is status post 42 radiation treatments.  And 1 hormone injection.    He is status post interferon therapy in 2014 for a melanoma discovered on his left arm.  He follows up with Dr. Deleon and her dermatology clinic      Orders Only on 09/13/2019   Component Date Value Ref Range Status    PSA - [facility] 09/13/2019 <0.1  0.0 - 4.0 ng/mL Final       Past Medical History:   Diagnosis Date    Cancer March 2013    melanoma left forearm, Dr Deleon    CVA (cerebral infarction) 11/2011    Hypercholesteremia     Hyperlipemia     Melanoma     left arm    Prostate cancer     Thyroid disease      Past Surgical History:   Procedure Laterality Date    LYMPHADENECTOMY      left axillary-Dr. Steiner     Family History   Problem Relation Age of Onset    Hypertension Mother     Heart disease Father     Hypertension Father     Heart disease Brother     Heart disease Sister     Cancer Paternal Grandfather         prostate       Marital Status:   Alcohol History:  reports that he drinks about 2.0 standard drinks of alcohol per week.  Tobacco History:  reports that he quit smoking about 51 years ago. His smoking use included cigarettes. His smokeless tobacco use includes snuff.  Drug History:  reports that he does not use drugs.    Review of patient's allergies indicates:   Allergen Reactions    No known drug allergies        Current Outpatient Medications:     aspirin (ECOTRIN) 81 MG EC tablet, Take 81 mg by mouth once daily.  , Disp: , Rfl:     atorvastatin (LIPITOR) 80 MG tablet, Take 1 tablet  (80 mg total) by mouth once daily., Disp: 90 tablet, Rfl: 1    CALCIUM CITRATE/VITAMIN D3 (CALCIUM CITRATE + D ORAL), Take 3 tablets by mouth once daily., Disp: , Rfl:     cyanocobalamin, vitamin B-12, 5,000 mcg TbDL, Take 1 tablet by mouth once a week., Disp: , Rfl:     HYDROcodone-acetaminophen (NORCO) 5-325 mg per tablet, Take 1 tablet by mouth every morning., Disp: 60 tablet, Rfl: 0    IRON/VITAMIN B COMPLEX (GERITOL ORAL), Take 1 capsule by mouth nightly., Disp: , Rfl:     levothyroxine (SYNTHROID) 50 MCG tablet, Take 1 tablet (50 mcg total) by mouth before breakfast., Disp: 90 tablet, Rfl: 1    MAGNESIUM ORAL, Take 2 tablets by mouth once daily. , Disp: , Rfl:     sildenafil (VIAGRA) 100 MG tablet, Take 1 tablet (100 mg total) by mouth as needed for Erectile Dysfunction., Disp: 10 tablet, Rfl: 11    UBIDECARENONE (COQ-10 ORAL), Take 1 tablet by mouth once daily., Disp: , Rfl:     Review of Systems   Constitutional: Negative for appetite change, chills, fatigue, fever and unexpected weight change.   HENT: Negative for congestion, ear pain and trouble swallowing.    Eyes: Negative for pain, discharge and visual disturbance.   Respiratory: Negative for apnea, cough, shortness of breath and wheezing.    Cardiovascular: Negative for chest pain and leg swelling.   Gastrointestinal: Negative for abdominal pain, blood in stool, constipation, diarrhea, nausea and vomiting.   Endocrine: Negative for cold intolerance, heat intolerance and polydipsia.   Genitourinary: Negative for dysuria, hematuria, testicular pain and urgency.        Dr Barboza following his PSA yrly   Musculoskeletal: Positive for arthralgias (takes pain meds  prn ). Negative for gait problem, joint swelling and myalgias.   Skin:        Lf arm melanoma removed   Neurological: Negative for dizziness, seizures and numbness.   Psychiatric/Behavioral: Negative for agitation, behavioral problems and hallucinations. The patient is not nervous/anxious.  "          Objective:      Vitals:    10/14/19 1013   BP: 136/86   Pulse: 60   Weight: 84.8 kg (187 lb)   Height: 5' 8" (1.727 m)     Body mass index is 28.43 kg/m².  Physical Exam      Assessment:       1. Special screening for malignant neoplasms, colon    2. Cerebral infarction, unspecified mechanism    3. Hypercholesteremia    4. Malignant melanoma of left upper extremity including shoulder    5. Prostate cancer    6. Acquired hypothyroidism    7. Primary osteoarthritis of both knees         Plan:       Special screening for malignant neoplasms, colon  -     Ambulatory Referral to Gastroenterology  It is time for his colonoscopy.  Cerebral infarction, unspecified mechanism  Stable at this time  Hypercholesteremia  -     atorvastatin (LIPITOR) 80 MG tablet; Take 1 tablet (80 mg total) by mouth once daily.  Dispense: 90 tablet; Refill: 1  Cholesterol at goal on current medications  Malignant melanoma of left upper extremity including shoulder  He will follow up with Dermatology this year  Prostate cancer  Dr. Barboza following his condition.  PSA is less than 0.1  Acquired hypothyroidism  -     levothyroxine (SYNTHROID) 50 MCG tablet; Take 1 tablet (50 mcg total) by mouth before breakfast.  Dispense: 90 tablet; Refill: 1  TSH is at goal continue as is.  Primary osteoarthritis of both knees  -     HYDROcodone-acetaminophen (NORCO) 5-325 mg per tablet; Take 1 tablet by mouth every morning.  Dispense: 60 tablet; Refill: 0  OK to refill hydrocodone    Follow up in about 6 months (around 4/14/2020) for Checkup with labs.            "

## 2020-01-07 DIAGNOSIS — M17.0 PRIMARY OSTEOARTHRITIS OF BOTH KNEES: ICD-10-CM

## 2020-01-07 RX ORDER — HYDROCODONE BITARTRATE AND ACETAMINOPHEN 5; 325 MG/1; MG/1
1 TABLET ORAL EVERY MORNING
Qty: 60 TABLET | Refills: 0 | Status: SHIPPED | OUTPATIENT
Start: 2020-01-07 | End: 2020-02-12 | Stop reason: SDUPTHER

## 2020-01-07 NOTE — TELEPHONE ENCOUNTER
----- Message from Nicolas Juarez sent at 1/7/2020 11:08 AM CST -----  Hydrocodone   [facility]   Pt [phone]

## 2020-01-09 ENCOUNTER — TELEPHONE (OUTPATIENT)
Dept: FAMILY MEDICINE | Facility: CLINIC | Age: 75
End: 2020-01-09

## 2020-01-09 NOTE — TELEPHONE ENCOUNTER
----- Message from Cristiana Reid sent at 1/9/2020  2:09 PM CST -----  Contact: [facility]  Call in @ 2:10 Hydrocodone 5/325mg once every mornning sent 1-7-20 for qty 60 they filled it for qty of 30 only and was giving us a courtesy call no need to call back.   # [phone]

## 2020-02-12 DIAGNOSIS — M17.0 PRIMARY OSTEOARTHRITIS OF BOTH KNEES: ICD-10-CM

## 2020-02-12 RX ORDER — HYDROCODONE BITARTRATE AND ACETAMINOPHEN 5; 325 MG/1; MG/1
1 TABLET ORAL EVERY MORNING
Qty: 30 TABLET | Refills: 0 | Status: SHIPPED | OUTPATIENT
Start: 2020-02-12 | End: 2020-03-16 | Stop reason: SDUPTHER

## 2020-02-12 NOTE — TELEPHONE ENCOUNTER
----- Message from Issa Gordon sent at 2/12/2020  3:00 PM CST -----  Contact: Charles Perez  Needs refill on Hydrocodone   Sent to [facility] pharmacy   Pt# [phone]

## 2020-03-16 DIAGNOSIS — M17.0 PRIMARY OSTEOARTHRITIS OF BOTH KNEES: ICD-10-CM

## 2020-03-16 RX ORDER — HYDROCODONE BITARTRATE AND ACETAMINOPHEN 5; 325 MG/1; MG/1
1 TABLET ORAL EVERY MORNING
Qty: 30 TABLET | Refills: 0 | Status: SHIPPED | OUTPATIENT
Start: 2020-03-16 | End: 2020-05-18 | Stop reason: SDUPTHER

## 2020-03-16 NOTE — TELEPHONE ENCOUNTER
----- Message from Issa Gordon sent at 3/16/2020 10:32 AM CDT -----  Contact: Charles Perez   Needs a refill on on Hydrocodone  Send to [facility]   Pt# [phone]

## 2020-03-18 ENCOUNTER — TELEPHONE (OUTPATIENT)
Dept: UROLOGY | Facility: CLINIC | Age: 75
End: 2020-03-18

## 2020-03-18 ENCOUNTER — TELEPHONE (OUTPATIENT)
Dept: FAMILY MEDICINE | Facility: CLINIC | Age: 75
End: 2020-03-18

## 2020-03-18 DIAGNOSIS — E78.00 HYPERCHOLESTEREMIA: ICD-10-CM

## 2020-03-18 DIAGNOSIS — Z79.899 ENCOUNTER FOR LONG-TERM (CURRENT) USE OF OTHER MEDICATIONS: ICD-10-CM

## 2020-03-18 DIAGNOSIS — Z00.00 ROUTINE GENERAL MEDICAL EXAMINATION AT A HEALTH CARE FACILITY: Primary | ICD-10-CM

## 2020-03-18 DIAGNOSIS — C61 PROSTATE CANCER: Primary | ICD-10-CM

## 2020-03-18 DIAGNOSIS — E03.9 ACQUIRED HYPOTHYROIDISM: ICD-10-CM

## 2020-03-18 NOTE — TELEPHONE ENCOUNTER
----- Message from Issa Gordon sent at 3/18/2020  2:42 PM CDT -----  Contact: Morris pt's wife  Pt needs lab orders sent over to [facility] so he can get his blood work in time for his appt with dr. Viera?? Please give wife a call back # [phone]

## 2020-03-18 NOTE — TELEPHONE ENCOUNTER
Sent pt PSA order to [facility] Pt verbalized understanding.     ----- Message from Clarissa Giatan sent at 3/18/2020  2:20 PM CDT -----  Type:  Patient Returning Call    Who Called:  Patient  Who Left Message for Patient:  Muriel  Does the patient know what this is regarding?:  no  Best Call Back Number:  [phone]

## 2020-03-19 ENCOUNTER — TELEPHONE (OUTPATIENT)
Dept: HEMATOLOGY/ONCOLOGY | Facility: CLINIC | Age: 75
End: 2020-03-19

## 2020-03-19 NOTE — TELEPHONE ENCOUNTER
----- Message from May Mehta, Patient Care Assistant sent at 3/18/2020  2:48 PM CDT -----  Patient Wife (Morris) called in stating she need his lab orders sent to [facility] tomorrow so he can get all his labs done at one time. She can be reached at [phone] or [phone]

## 2020-03-19 NOTE — TELEPHONE ENCOUNTER
Called the patient and spoke with his wife Laney.  She ws asking if she should do the labs?  Instructed her that it would be their call, but I would call [facility] because they might not be open.

## 2020-03-24 DIAGNOSIS — E78.00 HYPERCHOLESTEREMIA: ICD-10-CM

## 2020-03-24 DIAGNOSIS — E03.9 ACQUIRED HYPOTHYROIDISM: ICD-10-CM

## 2020-03-24 RX ORDER — LEVOTHYROXINE SODIUM 50 UG/1
50 TABLET ORAL
Qty: 90 TABLET | Refills: 1 | Status: SHIPPED | OUTPATIENT
Start: 2020-03-24 | End: 2020-11-18 | Stop reason: SDUPTHER

## 2020-03-24 RX ORDER — ATORVASTATIN CALCIUM 80 MG/1
80 TABLET, FILM COATED ORAL DAILY
Qty: 90 TABLET | Refills: 1 | Status: SHIPPED | OUTPATIENT
Start: 2020-03-24 | End: 2020-09-10 | Stop reason: SDUPTHER

## 2020-03-24 NOTE — TELEPHONE ENCOUNTER
----- Message from Nicolas Juarez sent at 3/24/2020 12:13 PM CDT -----  Levothyroxine and atorvastatin   [facility]    Pt [phone]

## 2020-04-07 ENCOUNTER — TELEPHONE (OUTPATIENT)
Dept: FAMILY MEDICINE | Facility: CLINIC | Age: 75
End: 2020-04-07

## 2020-04-09 ENCOUNTER — TELEPHONE (OUTPATIENT)
Dept: FAMILY MEDICINE | Facility: CLINIC | Age: 75
End: 2020-04-09

## 2020-04-09 NOTE — TELEPHONE ENCOUNTER
From overdue results-Encourage portal due to Covid-19. LMOR that we are encouraging portal and MyChart if he has a computer/smart phone. Informed him to call our office for assistance in setting this up.

## 2020-04-21 ENCOUNTER — TELEPHONE (OUTPATIENT)
Dept: HEMATOLOGY/ONCOLOGY | Facility: CLINIC | Age: 75
End: 2020-04-21

## 2020-04-21 NOTE — TELEPHONE ENCOUNTER
Called the pt to change his 04/28 appointment @8:30 to a virtual visit. He said he cannot do a virtual visit. He also stated his PET is not scheduled until 05/15. The patient will be scheduled for a date after his PET.

## 2020-05-11 ENCOUNTER — TELEPHONE (OUTPATIENT)
Dept: FAMILY MEDICINE | Facility: CLINIC | Age: 75
End: 2020-05-11

## 2020-05-11 NOTE — TELEPHONE ENCOUNTER
----- Message from Issa Gordon sent at 5/11/2020  8:41 AM CDT -----  Contact: Charles Perez   Pt needs to schedule an appt with dr. Viera as well preferably the same day as his wife Morris sravan.   Pt# [phone]

## 2020-05-14 LAB
ALBUMIN SERPL-MCNC: 4.4 G/DL (ref 3.7–4.7)
ALBUMIN/GLOB SERPL: 1.5 {RATIO} (ref 1.2–2.2)
ALP SERPL-CCNC: 73 IU/L (ref 39–117)
ALT SERPL-CCNC: 36 IU/L (ref 0–44)
AST SERPL-CCNC: 23 IU/L (ref 0–40)
BASOPHILS # BLD AUTO: 0 X10E3/UL (ref 0–0.2)
BASOPHILS NFR BLD AUTO: 1 %
BILIRUB SERPL-MCNC: 0.3 MG/DL (ref 0–1.2)
BUN SERPL-MCNC: 16 MG/DL (ref 8–27)
BUN/CREAT SERPL: 20 (ref 10–24)
CALCIUM SERPL-MCNC: 9.9 MG/DL (ref 8.6–10.2)
CHLORIDE SERPL-SCNC: 106 MMOL/L (ref 96–106)
CHOLEST SERPL-MCNC: 142 MG/DL (ref 100–199)
CO2 SERPL-SCNC: 21 MMOL/L (ref 20–29)
CREAT SERPL-MCNC: 0.79 MG/DL (ref 0.76–1.27)
EOSINOPHIL # BLD AUTO: 0.2 X10E3/UL (ref 0–0.4)
EOSINOPHIL NFR BLD AUTO: 3 %
ERYTHROCYTE [DISTWIDTH] IN BLOOD BY AUTOMATED COUNT: 13.1 % (ref 11.6–15.4)
GLOBULIN SER CALC-MCNC: 3 G/DL (ref 1.5–4.5)
GLUCOSE SERPL-MCNC: 114 MG/DL (ref 65–99)
HCT VFR BLD AUTO: 47.8 % (ref 37.5–51)
HDLC SERPL-MCNC: 43 MG/DL
HGB BLD-MCNC: 15.6 G/DL (ref 13–17.7)
IMM GRANULOCYTES # BLD AUTO: 0 X10E3/UL (ref 0–0.1)
IMM GRANULOCYTES NFR BLD AUTO: 0 %
LDLC SERPL CALC-MCNC: 84 MG/DL (ref 0–99)
LYMPHOCYTES # BLD AUTO: 2.2 X10E3/UL (ref 0.7–3.1)
LYMPHOCYTES NFR BLD AUTO: 40 %
MCH RBC QN AUTO: 30.8 PG (ref 26.6–33)
MCHC RBC AUTO-ENTMCNC: 32.6 G/DL (ref 31.5–35.7)
MCV RBC AUTO: 94 FL (ref 79–97)
MONOCYTES # BLD AUTO: 0.6 X10E3/UL (ref 0.1–0.9)
MONOCYTES NFR BLD AUTO: 11 %
NEUTROPHILS # BLD AUTO: 2.4 X10E3/UL (ref 1.4–7)
NEUTROPHILS NFR BLD AUTO: 45 %
PLATELET # BLD AUTO: 357 X10E3/UL (ref 150–450)
POTASSIUM SERPL-SCNC: 4.6 MMOL/L (ref 3.5–5.2)
PROT SERPL-MCNC: 7.4 G/DL (ref 6–8.5)
RBC # BLD AUTO: 5.07 X10E6/UL (ref 4.14–5.8)
SODIUM SERPL-SCNC: 142 MMOL/L (ref 134–144)
TRIGL SERPL-MCNC: 76 MG/DL (ref 0–149)
TSH SERPL DL<=0.005 MIU/L-ACNC: 3.26 UIU/ML (ref 0.45–4.5)
VLDLC SERPL CALC-MCNC: 15 MG/DL (ref 5–40)
WBC # BLD AUTO: 5.5 X10E3/UL (ref 3.4–10.8)

## 2020-05-15 ENCOUNTER — HOSPITAL ENCOUNTER (OUTPATIENT)
Dept: RADIOLOGY | Facility: HOSPITAL | Age: 75
Discharge: HOME | End: 2020-05-15
Attending: INTERNAL MEDICINE
Payer: MEDICARE

## 2020-05-15 VITALS — WEIGHT: 175 LBS | BODY MASS INDEX: 26.52 KG/M2 | HEIGHT: 68 IN

## 2020-05-15 DIAGNOSIS — C43.9 MALIGNANT MELANOMA, UNSPECIFIED SITE: ICD-10-CM

## 2020-05-15 DIAGNOSIS — C61 PROSTATE CANCER: ICD-10-CM

## 2020-05-15 LAB — GLUCOSE SERPL-MCNC: 100 MG/DL (ref 70–110)

## 2020-05-15 PROCEDURE — 78816 PET IMAGE W/CT FULL BODY: CPT | Mod: TC,PO

## 2020-05-18 ENCOUNTER — OFFICE VISIT (OUTPATIENT)
Dept: FAMILY MEDICINE | Facility: CLINIC | Age: 75
End: 2020-05-18
Payer: MEDICARE

## 2020-05-18 VITALS
DIASTOLIC BLOOD PRESSURE: 85 MMHG | TEMPERATURE: 98 F | WEIGHT: 183 LBS | HEIGHT: 68 IN | SYSTOLIC BLOOD PRESSURE: 135 MMHG | HEART RATE: 68 BPM | BODY MASS INDEX: 27.74 KG/M2

## 2020-05-18 DIAGNOSIS — E78.00 HYPERCHOLESTEREMIA: ICD-10-CM

## 2020-05-18 DIAGNOSIS — M17.0 PRIMARY OSTEOARTHRITIS OF BOTH KNEES: Primary | ICD-10-CM

## 2020-05-18 DIAGNOSIS — I63.9 CEREBRAL INFARCTION, UNSPECIFIED MECHANISM: ICD-10-CM

## 2020-05-18 DIAGNOSIS — E03.9 ACQUIRED HYPOTHYROIDISM: ICD-10-CM

## 2020-05-18 DIAGNOSIS — C61 PROSTATE CANCER: ICD-10-CM

## 2020-05-18 DIAGNOSIS — C43.62 MALIGNANT MELANOMA OF LEFT UPPER EXTREMITY INCLUDING SHOULDER: ICD-10-CM

## 2020-05-18 PROCEDURE — 1100F PTFALLS ASSESS-DOCD GE2>/YR: CPT | Mod: S$GLB,,, | Performed by: FAMILY MEDICINE

## 2020-05-18 PROCEDURE — 1100F PR PT FALLS ASSESS DOC 2+ FALLS/FALL W/INJURY/YR: ICD-10-PCS | Mod: S$GLB,,, | Performed by: FAMILY MEDICINE

## 2020-05-18 PROCEDURE — 1159F PR MEDICATION LIST DOCUMENTED IN MEDICAL RECORD: ICD-10-PCS | Mod: S$GLB,,, | Performed by: FAMILY MEDICINE

## 2020-05-18 PROCEDURE — 99214 OFFICE O/P EST MOD 30 MIN: CPT | Mod: S$GLB,,, | Performed by: FAMILY MEDICINE

## 2020-05-18 PROCEDURE — 99214 PR OFFICE/OUTPT VISIT, EST, LEVL IV, 30-39 MIN: ICD-10-PCS | Mod: S$GLB,,, | Performed by: FAMILY MEDICINE

## 2020-05-18 PROCEDURE — 3288F FALL RISK ASSESSMENT DOCD: CPT | Mod: S$GLB,,, | Performed by: FAMILY MEDICINE

## 2020-05-18 PROCEDURE — 3288F PR FALLS RISK ASSESSMENT DOCUMENTED: ICD-10-PCS | Mod: S$GLB,,, | Performed by: FAMILY MEDICINE

## 2020-05-18 PROCEDURE — 1159F MED LIST DOCD IN RCRD: CPT | Mod: S$GLB,,, | Performed by: FAMILY MEDICINE

## 2020-05-18 RX ORDER — HYDROCODONE BITARTRATE AND ACETAMINOPHEN 5; 325 MG/1; MG/1
1 TABLET ORAL EVERY 12 HOURS PRN
Qty: 60 TABLET | Refills: 0 | Status: SHIPPED | OUTPATIENT
Start: 2020-05-18 | End: 2020-07-07 | Stop reason: SDUPTHER

## 2020-05-18 NOTE — PROGRESS NOTES
SUBJECTIVE:    Patient ID: Charles Perez is a 74 y.o. male.    Chief Complaint: Follow-up (6 month, no bottles// SW)    This 74-year-old male complains of arthritis pain in shoulders, knees, hips and ankles.  He has been quite busy and active in the yd.  He has very large garden that he is been tending to as well as mowing the entire property.  Occasionally feels sharp pains in his left foot but otherwise he walks slowly but steadily.    He is status post external radiation beam therapy for prostate cancer in 2016.  He follows up with Dr. Barboza and is most recent PSAs have been less than 0.1.  He also has had a melanoma removed from his arm as well as a left forearm squamous cell carcinoma with Dr. Deleon.    He has undergone colonoscopy with Dr. Fermin in 2020 and was asked to return in 3 years.      Hospital Outpatient Visit on 05/15/2020   Component Date Value Ref Range Status    POC Glucose 05/15/2020 100  70 - 110 Final   Telephone on 03/18/2020   Component Date Value Ref Range Status    WBC 05/13/2020 5.5  3.4 - 10.8 x10E3/uL Final    RBC 05/13/2020 5.07  4.14 - 5.80 x10E6/uL Final    Hemoglobin 05/13/2020 15.6  13.0 - 17.7 g/dL Final    Hematocrit 05/13/2020 47.8  37.5 - 51.0 % Final    Mean Corpuscular Volume 05/13/2020 94  79 - 97 fL Final    Mean Corpuscular Hemoglobin 05/13/2020 30.8  26.6 - 33.0 pg Final    Mean Corpuscular Hemoglobin Conc 05/13/2020 32.6  31.5 - 35.7 g/dL Final    RDW 05/13/2020 13.1  11.6 - 15.4 % Final    Platelets 05/13/2020 357  150 - 450 x10E3/uL Final    Neutrophils 05/13/2020 45  Not Estab. % Final    Lymph% 05/13/2020 40  Not Estab. % Final    Mono% 05/13/2020 11  Not Estab. % Final    Eosinophil% 05/13/2020 3  Not Estab. % Final    Basophil% 05/13/2020 1  Not Estab. % Final    Neutrophils Absolute 05/13/2020 2.4  1.4 - 7.0 x10E3/uL Final    Lymph # 05/13/2020 2.2  0.7 - 3.1 x10E3/uL Final    Mono # 05/13/2020 0.6  0.1 - 0.9 x10E3/uL Final    Eos #  05/13/2020 0.2  0.0 - 0.4 x10E3/uL Final    Baso # 05/13/2020 0.0  0.0 - 0.2 x10E3/uL Final    Immature Granulocytes 05/13/2020 0  Not Estab. % Final    Immature Grans (Abs) 05/13/2020 0.0  0.0 - 0.1 x10E3/uL Final    Glucose 05/13/2020 114* 65 - 99 mg/dL Final    BUN, Bld 05/13/2020 16  8 - 27 mg/dL Final    Creatinine 05/13/2020 0.79  0.76 - 1.27 mg/dL Final    eGFR if non African American 05/13/2020 88  >59 mL/min/1.73 Final    eGFR if African American 05/13/2020 102  >59 mL/min/1.73 Final    BUN/Creatinine Ratio 05/13/2020 20  10 - 24 Final    Sodium 05/13/2020 142  134 - 144 mmol/L Final    Potassium 05/13/2020 4.6  3.5 - 5.2 mmol/L Final    Chloride 05/13/2020 106  96 - 106 mmol/L Final    CO2 05/13/2020 21  20 - 29 mmol/L Final    Calcium 05/13/2020 9.9  8.6 - 10.2 mg/dL Final    Total Protein 05/13/2020 7.4  6.0 - 8.5 g/dL Final    Albumin 05/13/2020 4.4  3.7 - 4.7 g/dL Final    Globulin, Total 05/13/2020 3.0  1.5 - 4.5 g/dL Final    Albumin/Globulin Ratio 05/13/2020 1.5  1.2 - 2.2 Final    Total Bilirubin 05/13/2020 0.3  0.0 - 1.2 mg/dL Final    Alkaline Phosphatase 05/13/2020 73  39 - 117 IU/L Final    AST 05/13/2020 23  0 - 40 IU/L Final    ALT 05/13/2020 36  0 - 44 IU/L Final    Cholesterol 05/13/2020 142  100 - 199 mg/dL Final    Triglycerides 05/13/2020 76  0 - 149 mg/dL Final    HDL 05/13/2020 43  >39 mg/dL Final    VLDL Cholesterol Frederic 05/13/2020 15  5 - 40 mg/dL Final    LDL Calculated 05/13/2020 84  0 - 99 mg/dL Final    TSH 05/13/2020 3.260  0.450 - 4.500 uIU/mL Final       Past Medical History:   Diagnosis Date    Cancer March 2013    melanoma left forearm, Dr Deleon    CVA (cerebral infarction) 11/2011    Hypercholesteremia     Hyperlipemia     Melanoma     left arm    Prostate cancer     Thyroid disease      Past Surgical History:   Procedure Laterality Date    LYMPHADENECTOMY      left axillary-Dr. Steiner     Family History   Problem Relation Age of Onset     Hypertension Mother     Heart disease Father     Hypertension Father     Heart disease Brother     Heart disease Sister     Cancer Paternal Grandfather         prostate       Marital Status:   Alcohol History:  reports that he drinks about 2.0 standard drinks of alcohol per week.  Tobacco History:  reports that he quit smoking about 52 years ago. His smoking use included cigarettes. His smokeless tobacco use includes snuff.  Drug History:  reports that he does not use drugs.    Review of patient's allergies indicates:   Allergen Reactions    No known drug allergies        Current Outpatient Medications:     aspirin (ECOTRIN) 81 MG EC tablet, Take 81 mg by mouth once daily.  , Disp: , Rfl:     atorvastatin (LIPITOR) 80 MG tablet, Take 1 tablet (80 mg total) by mouth once daily., Disp: 90 tablet, Rfl: 1    CALCIUM CITRATE/VITAMIN D3 (CALCIUM CITRATE + D ORAL), Take 3 tablets by mouth once daily., Disp: , Rfl:     cyanocobalamin, vitamin B-12, 5,000 mcg TbDL, Take 1 tablet by mouth once a week., Disp: , Rfl:     HYDROcodone-acetaminophen (NORCO) 5-325 mg per tablet, Take 1 tablet by mouth every 12 (twelve) hours as needed for Pain., Disp: 60 tablet, Rfl: 0    IRON/VITAMIN B COMPLEX (GERITOL ORAL), Take 1 capsule by mouth nightly., Disp: , Rfl:     levothyroxine (SYNTHROID) 50 MCG tablet, Take 1 tablet (50 mcg total) by mouth before breakfast., Disp: 90 tablet, Rfl: 1    MAGNESIUM ORAL, Take 2 tablets by mouth once daily. , Disp: , Rfl:     UBIDECARENONE (COQ-10 ORAL), Take 1 tablet by mouth once daily., Disp: , Rfl:     sildenafil (VIAGRA) 100 MG tablet, Take 1 tablet (100 mg total) by mouth as needed for Erectile Dysfunction., Disp: 10 tablet, Rfl: 11    Review of Systems   Constitutional: Negative for appetite change, chills, fatigue, fever and unexpected weight change.   HENT: Negative for congestion, ear pain and trouble swallowing.    Eyes: Negative for pain, discharge and visual  "disturbance.   Respiratory: Negative for apnea, cough, shortness of breath and wheezing.    Cardiovascular: Negative for chest pain and leg swelling.   Gastrointestinal: Negative for abdominal pain, blood in stool, constipation, diarrhea, nausea and vomiting.   Endocrine: Negative for cold intolerance, heat intolerance and polydipsia.   Genitourinary: Negative for dysuria, hematuria, testicular pain and urgency.   Musculoskeletal: Positive for arthralgias (Arthralgias noticed in the shoulders knees ankles and hips, he uses Norco 5-325 once or twice a day for pain). Negative for gait problem, joint swelling and myalgias.   Skin:        Melanoma and squamous cell removed from his left arm recently   Neurological: Negative for dizziness, seizures and numbness.   Psychiatric/Behavioral: Negative for agitation, behavioral problems and hallucinations. The patient is not nervous/anxious.           Objective:      Vitals:    05/18/20 1338 05/18/20 1342 05/18/20 2042   BP: (!) 144/90 (!) 150/100 135/85   Pulse: 68     Temp: 97.9 °F (36.6 °C)     Weight: 83 kg (183 lb)     Height: 5' 8" (1.727 m)       Body mass index is 27.83 kg/m².  Physical Exam   Constitutional: He is oriented to person, place, and time. He appears well-developed and well-nourished.   HENT:   Head: Normocephalic and atraumatic.   Right Ear: External ear normal.   Left Ear: External ear normal.   Nose: Nose normal.   Mouth/Throat: Oropharynx is clear and moist.   Eyes: Pupils are equal, round, and reactive to light. EOM are normal.   Neck: Normal range of motion. Neck supple. Carotid bruit is not present. No thyromegaly present.   Cardiovascular: Normal rate, regular rhythm, normal heart sounds and intact distal pulses.   No murmur heard.  Pulmonary/Chest: Effort normal and breath sounds normal. He has no wheezes. He has no rales.   Abdominal: Soft. Bowel sounds are normal. He exhibits no distension. There is no hepatosplenomegaly. There is no tenderness. "   Musculoskeletal: Normal range of motion. He exhibits no tenderness or deformity.        Lumbar back: Normal. He exhibits no pain and no spasm.   Bends 90 degrees at  waist shoulders and knees have good range of motion without crepitance.  He has no peripheral edema.  He walks with a slow but steady gait.   Lymphadenopathy:     He has no cervical adenopathy.   Neurological: He is alert and oriented to person, place, and time. No cranial nerve deficit. Coordination normal.   Skin: Skin is warm and dry. No rash noted.   Psychiatric: He has a normal mood and affect. His behavior is normal. Judgment and thought content normal.   Nursing note and vitals reviewed.        Assessment:       1. Primary osteoarthritis of both knees    2. Acquired hypothyroidism    3. Malignant melanoma of left upper extremity including shoulder    4. Prostate cancer    5. Hypercholesteremia    6. Cerebral infarction, unspecified mechanism         Plan:       Primary osteoarthritis of both knees  -     HYDROcodone-acetaminophen (NORCO) 5-325 mg per tablet; Take 1 tablet by mouth every 12 (twelve) hours as needed for Pain.  Dispense: 60 tablet; Refill: 0  Will increase hydrocodone to twice a day for arthritic pains.  Acquired hypothyroidism  Continue levothyroxine.  Malignant melanoma of left upper extremity including shoulder  Followed by dermatology with   Prostate cancer  PSA less than 0.1, followed by Dr. Barboza  Hypercholesteremia  Continue atorvastatin lipids are at goal on currently;recheck labs in 6 months  Cerebral infarction, unspecified mechanism  No neuro deficits at this time    No follow-ups on file.

## 2020-05-18 NOTE — PROGRESS NOTES
[facility] Hematology/Oncology  PROGRESS NOTE - Telemedicine Visit      Subjective:       Patient ID:   NAME: Charles Perez : 1945     74 y.o. male    Referring Doc: Maximiliano  Other Physicians: Pancho Barboza Dugan, Blanco, Hightower    Chief Complaint:  Melanoma f/u    History of Present Illness:     Patient is being seen today via a telemedicine follow-up visit in lieu of a normal in-person visit due to the recent COVID19 outbreak. The patient is currently located at home. This visit type is a virtual visit with synchronous audio without video.         The patient is on with his wife. He follows-up with Dr Barboza and the last PSA was good per patient. He denies any CP, SOB, HA's or N/V. He had recent PET scan on 5/15/2020 and is on to go over the results.   He saw Dr Barboza recently on 3/7/2019.     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    Discussed COVID19 precautions          ROS:   GEN: normal without any fever, night sweats or weight loss  HEENT: normal with no HA's, sore throat, stiff neck, changes in vision  CV: normal with no CP, SOB, PND, MILLARD or orthopnea  PULM: normal with no SOB, cough, hemoptysis, sputum or pleuritic pain  GI: normal with no abdominal pain, nausea, vomiting, constipation, diarrhea, melanotic stools, BRBPR, or hematemesis  : normal with no hematuria, dysuria  BREAST: normal with no mass, discharge, pain  SKIN: normal with no rash, erythema, bruising, or swelling    Allergies:  Review of patient's allergies indicates:   Allergen Reactions    No known drug allergies        Medications:    Current Outpatient Medications:     aspirin (ECOTRIN) 81 MG EC tablet, Take 81 mg by mouth once daily.  , Disp: , Rfl:     atorvastatin (LIPITOR) 80 MG tablet, Take 1 tablet (80 mg total) by mouth once daily., Disp: 90 tablet, Rfl: 1    CALCIUM CITRATE/VITAMIN D3 (CALCIUM CITRATE + D ORAL), Take 3 tablets by  mouth once daily., Disp: , Rfl:     cyanocobalamin, vitamin B-12, 5,000 mcg TbDL, Take 1 tablet by mouth once a week., Disp: , Rfl:     HYDROcodone-acetaminophen (NORCO) 5-325 mg per tablet, Take 1 tablet by mouth every morning., Disp: 30 tablet, Rfl: 0    IRON/VITAMIN B COMPLEX (GERITOL ORAL), Take 1 capsule by mouth nightly., Disp: , Rfl:     levothyroxine (SYNTHROID) 50 MCG tablet, Take 1 tablet (50 mcg total) by mouth before breakfast., Disp: 90 tablet, Rfl: 1    MAGNESIUM ORAL, Take 2 tablets by mouth once daily. , Disp: , Rfl:     sildenafil (VIAGRA) 100 MG tablet, Take 1 tablet (100 mg total) by mouth as needed for Erectile Dysfunction., Disp: 10 tablet, Rfl: 11    UBIDECARENONE (COQ-10 ORAL), Take 1 tablet by mouth once daily., Disp: , Rfl:     PMHx/PSHx Updates:  See patient's last visit with me on 4/17/2018.  See H&P on 4/2/2014        Pathology:  See Vol #1          Objective:     Vitals:  afebrile    Physical Examination:   GEN: no apparent distress, comfortable; AAOx3  HEAD: atraumatic and normocephalic  EYES: no conjunctival pallor or muddiness, no icterus; normal pupil reaction to ambient light  ENT: OMM, no pharyngeal erythema, external bilateral ears WNL; no visible thrush or ulcers  NECK: no masses or swelling, trachea midline, no visible LAD/LN's   CV: no palpitations; no pedal edema; no noticeable JVD or neck vein distension  CHEST: Normal respiratory effort; chest wall breath movements symmetrical; no audible wheezing  ABDOM: non-distended; no bloating  MUSC/Skeletal: ROM normal; joints visibly normal; no deformities or arthropathy  EXTREM: no clubbing, cyanosis, inflammation or swelling  SKIN: no rashes, lesions, ulcers, petechiae or subcutaneous nodules  : no pearce  NEURO: moving all 4 extremities; AAOx3; no tremors  PSYCH: normal mood, affect and behavior  LYMPH: no visible LN's or LAD              Labs:   5/13/2020  Lab Results   Component Value Date    WBC 5.5 05/13/2020    HGB  15.6 05/13/2020    HCT 47.8 05/13/2020    MCV 94 05/13/2020     05/13/2020       CMP  Sodium   Date Value Ref Range Status   05/13/2020 142 134 - 144 mmol/L Final     Potassium   Date Value Ref Range Status   05/13/2020 4.6 3.5 - 5.2 mmol/L Final     Chloride   Date Value Ref Range Status   05/13/2020 106 96 - 106 mmol/L Final     CO2   Date Value Ref Range Status   05/13/2020 21 20 - 29 mmol/L Final     Glucose   Date Value Ref Range Status   05/13/2020 114 (H) 65 - 99 mg/dL Final     BUN, Bld   Date Value Ref Range Status   05/13/2020 16 8 - 27 mg/dL Final     Creatinine   Date Value Ref Range Status   05/13/2020 0.79 0.76 - 1.27 mg/dL Final     Calcium   Date Value Ref Range Status   05/13/2020 9.9 8.6 - 10.2 mg/dL Final     Total Protein   Date Value Ref Range Status   05/13/2020 7.4 6.0 - 8.5 g/dL Final     Albumin   Date Value Ref Range Status   05/13/2020 4.4 3.7 - 4.7 g/dL Final     Total Bilirubin   Date Value Ref Range Status   05/13/2020 0.3 0.0 - 1.2 mg/dL Final     Alkaline Phosphatase   Date Value Ref Range Status   05/13/2020 73 39 - 117 IU/L Final     AST   Date Value Ref Range Status   05/13/2020 23 0 - 40 IU/L Final     ALT   Date Value Ref Range Status   05/13/2020 36 0 - 44 IU/L Final     Anion Gap   Date Value Ref Range Status   11/28/2011 9.0 8 - 16 mmol/L Final     eGFR if    Date Value Ref Range Status   11/28/2011 >60 >60 mL/min Final     Comment:     Estimated glomerular filtration rate (eGFR) is normalized to an  average body surface area of 1.73 square meters.  The calculation  used to obtain the eGFR is the adjusted MDRD equation, which factors  patient sex, age, race, and creatinine result.  Since race is unknown  in our information system, the eGFR values for -American  and Non--American patients are given for each creatinine  result.     eGFR if non    Date Value Ref Range Status   05/13/2020 88 >59 mL/min/1.73 Final              Radiology/Diagnostic Studies:    PET 5/15/2020:    Impression       No unfavorable change compared to prior PET-CT examinations.    Persistence of bilateral hilar lymph node hypermetabolism seen on multiple prior studies.         PET Scan  4/12/2019:    IMPRESSION:  There is no evidence of metastasis and no unfavorable change from previous CT and PET scans            I have reviewed all available lab results and radiology reports.    Assessment/Plan:   (1) 74 y.o. male with diagnosis of melanoma involving the LUE in past  - s/p 12 months of interferon therapy in past  - he was a stage III  - latest PET was 5/15/2020 and stable    (2) Prior pulmonary nodules - followed by Dr William in past; prior EBUS which was negative    (3) Prostate CA - followed by Dr Barboza; off Lupron now  - latest PSA was <0.1    (4) recent Left UE  SCCA removed by Dr Deleon        PLAN:  1. F/u with PCP,  and Derm  2. Schedule yearly PET scan  3. Check labs yearly at least  4. RTC in 12 months    Fax note to Stewart Viera Romano, Dimitri      Total Time spent on patient:    I spent over 25 mins of time with the patient. Reviewed results of the recently ordered labs, tests, reports and studies; made directives with regards to the results. Over half of this time was spent couseling and coordinating care, making treatment and analytical decisions; ordering necessary labs, tests and studies; and discussing treatment options and setting up treatment plan(s) if indicated.          Discussion:       Pathology Discussion:    I reviewed and discussed the pathology report(s) and radiograph reports (if available) in as simple to understand and/or laymen's terms to the best of my ability. I had an indepth conversation with the patient and went over the patient's individual diagnosis based on the information that was currently available. I discussed the TNM staging process with regard to the patient's particular cancer type, and the  calculated stage based on the currently available TNM data and literature. I discussed the available prognostic data with regard to the current staging information and how it relates to the prognosis of their particular neoplastic process.          COVID-19 Discussion:    I had long discussion with patient and any applicable family about the COVID-19 coronavirus epidemic and the recommended precautions with regard to cancer and/or hematology patients. I have re-iterated the CDC recommendations for adequate hand washing, use of hand -like products, and coughing into elbow, etc. In addition, especially for our patients who are on chemotherapy and/or our otherwise immunocompromised patients, I have recommended avoidance of crowds, including movie theaters, restaurants, churches, etc. I have recommended avoidance of any sick or symptomatic family members and/or friends. I have also recommended avoidance of any raw and unwashed food products, and general avoidance of food items that have not been prepared by themselves. The patient has been asked to call us immediately with any symptom developments, issues, questions or other general concerns.       Telemedicine Statement:    The patient acknowledged and agreed to the audio/video encounter and the patient who is being provided medical services by telemedicine is:  (1) informed of the relationship between the physician and patient and the respective role of any other health care provider with respect to management of the patient; and (2) notified that he or she may decline to receive medical services by telemedicine and may withdraw from such care at any time.    I have explained all of the above in detail and the patient understands all of the current recommendation(s). I have answered all of their questions to the best of my ability and to their complete satisfaction.   The patient is to continue with the current management plan.            Electronically  signed by Kole Boyle MD

## 2020-05-19 ENCOUNTER — OFFICE VISIT (OUTPATIENT)
Dept: HEMATOLOGY/ONCOLOGY | Facility: CLINIC | Age: 75
End: 2020-05-19
Payer: MEDICARE

## 2020-05-19 DIAGNOSIS — C43.62 MALIGNANT MELANOMA OF LEFT UPPER EXTREMITY INCLUDING SHOULDER: Primary | ICD-10-CM

## 2020-05-19 PROCEDURE — 1159F MED LIST DOCD IN RCRD: CPT | Mod: 95,,, | Performed by: INTERNAL MEDICINE

## 2020-05-19 PROCEDURE — 1100F PR PT FALLS ASSESS DOC 2+ FALLS/FALL W/INJURY/YR: ICD-10-PCS | Mod: 95,,, | Performed by: INTERNAL MEDICINE

## 2020-05-19 PROCEDURE — 1159F PR MEDICATION LIST DOCUMENTED IN MEDICAL RECORD: ICD-10-PCS | Mod: 95,,, | Performed by: INTERNAL MEDICINE

## 2020-05-19 PROCEDURE — 3288F FALL RISK ASSESSMENT DOCD: CPT | Mod: 95,,, | Performed by: INTERNAL MEDICINE

## 2020-05-19 PROCEDURE — 3288F PR FALLS RISK ASSESSMENT DOCUMENTED: ICD-10-PCS | Mod: 95,,, | Performed by: INTERNAL MEDICINE

## 2020-05-19 PROCEDURE — 99213 OFFICE O/P EST LOW 20 MIN: CPT | Mod: 95,,, | Performed by: INTERNAL MEDICINE

## 2020-05-19 PROCEDURE — 1100F PTFALLS ASSESS-DOCD GE2>/YR: CPT | Mod: 95,,, | Performed by: INTERNAL MEDICINE

## 2020-05-19 PROCEDURE — 99213 PR OFFICE/OUTPT VISIT, EST, LEVL III, 20-29 MIN: ICD-10-PCS | Mod: 95,,, | Performed by: INTERNAL MEDICINE

## 2020-07-07 DIAGNOSIS — M17.0 PRIMARY OSTEOARTHRITIS OF BOTH KNEES: ICD-10-CM

## 2020-07-07 RX ORDER — HYDROCODONE BITARTRATE AND ACETAMINOPHEN 5; 325 MG/1; MG/1
1 TABLET ORAL EVERY 12 HOURS PRN
Qty: 60 TABLET | Refills: 0 | Status: SHIPPED | OUTPATIENT
Start: 2020-07-07 | End: 2020-09-04 | Stop reason: SDUPTHER

## 2020-07-07 NOTE — TELEPHONE ENCOUNTER
----- Message from Nicolas Juarez sent at 7/7/2020  2:07 PM CDT -----  Regarding: refills  Hydrocodone   [facility]   Pt [phone]

## 2020-09-04 DIAGNOSIS — M17.0 PRIMARY OSTEOARTHRITIS OF BOTH KNEES: ICD-10-CM

## 2020-09-04 RX ORDER — HYDROCODONE BITARTRATE AND ACETAMINOPHEN 5; 325 MG/1; MG/1
1 TABLET ORAL EVERY 12 HOURS PRN
Qty: 60 TABLET | Refills: 0 | Status: SHIPPED | OUTPATIENT
Start: 2020-09-04 | End: 2020-11-18 | Stop reason: SDUPTHER

## 2020-09-04 NOTE — TELEPHONE ENCOUNTER
----- Message from Nicolas Juarez sent at 9/4/2020  9:18 AM CDT -----  Regarding: refills  Hydrocodone   [facility]   Pt [phone]

## 2020-09-10 DIAGNOSIS — E78.00 HYPERCHOLESTEREMIA: ICD-10-CM

## 2020-09-10 RX ORDER — ATORVASTATIN CALCIUM 80 MG/1
80 TABLET, FILM COATED ORAL DAILY
Qty: 90 TABLET | Refills: 1 | Status: SHIPPED | OUTPATIENT
Start: 2020-09-10 | End: 2020-11-18 | Stop reason: SDUPTHER

## 2020-09-10 NOTE — TELEPHONE ENCOUNTER
----- Message from Cristiana Reid sent at 9/10/2020 11:27 AM CDT -----  Pt calling for refill on Atorvastatin to [facility] mail order   # [phone]

## 2020-10-01 ENCOUNTER — OFFICE VISIT (OUTPATIENT)
Dept: FAMILY MEDICINE | Facility: CLINIC | Age: 75
End: 2020-10-01
Payer: MEDICARE

## 2020-10-01 VITALS
TEMPERATURE: 97 F | BODY MASS INDEX: 28.34 KG/M2 | HEIGHT: 68 IN | DIASTOLIC BLOOD PRESSURE: 70 MMHG | WEIGHT: 187 LBS | HEART RATE: 68 BPM | SYSTOLIC BLOOD PRESSURE: 132 MMHG

## 2020-10-01 DIAGNOSIS — T63.304A SPIDER BITE WOUND, UNDETERMINED INTENT, INITIAL ENCOUNTER: Primary | ICD-10-CM

## 2020-10-01 PROCEDURE — 3008F BODY MASS INDEX DOCD: CPT | Mod: S$GLB,,, | Performed by: FAMILY MEDICINE

## 2020-10-01 PROCEDURE — 1159F MED LIST DOCD IN RCRD: CPT | Mod: S$GLB,,, | Performed by: FAMILY MEDICINE

## 2020-10-01 PROCEDURE — 1100F PTFALLS ASSESS-DOCD GE2>/YR: CPT | Mod: S$GLB,,, | Performed by: FAMILY MEDICINE

## 2020-10-01 PROCEDURE — 3288F PR FALLS RISK ASSESSMENT DOCUMENTED: ICD-10-PCS | Mod: S$GLB,,, | Performed by: FAMILY MEDICINE

## 2020-10-01 PROCEDURE — 99213 OFFICE O/P EST LOW 20 MIN: CPT | Mod: S$GLB,,, | Performed by: FAMILY MEDICINE

## 2020-10-01 PROCEDURE — 1159F PR MEDICATION LIST DOCUMENTED IN MEDICAL RECORD: ICD-10-PCS | Mod: S$GLB,,, | Performed by: FAMILY MEDICINE

## 2020-10-01 PROCEDURE — 3288F FALL RISK ASSESSMENT DOCD: CPT | Mod: S$GLB,,, | Performed by: FAMILY MEDICINE

## 2020-10-01 PROCEDURE — 1100F PR PT FALLS ASSESS DOC 2+ FALLS/FALL W/INJURY/YR: ICD-10-PCS | Mod: S$GLB,,, | Performed by: FAMILY MEDICINE

## 2020-10-01 PROCEDURE — 99213 PR OFFICE/OUTPT VISIT, EST, LEVL III, 20-29 MIN: ICD-10-PCS | Mod: S$GLB,,, | Performed by: FAMILY MEDICINE

## 2020-10-01 PROCEDURE — 3008F PR BODY MASS INDEX (BMI) DOCUMENTED: ICD-10-PCS | Mod: S$GLB,,, | Performed by: FAMILY MEDICINE

## 2020-10-01 RX ORDER — DOXYCYCLINE 100 MG/1
100 CAPSULE ORAL
Qty: 20 CAPSULE | Refills: 0 | Status: SHIPPED | OUTPATIENT
Start: 2020-10-01 | End: 2020-11-18

## 2020-10-01 NOTE — PROGRESS NOTES
SUBJECTIVE:    Patient ID: Charles Perez is a 74 y.o. male.    Chief Complaint: Insect Bite (possible spider bite, left forearm - 1 week ago ac )    This 74-year-old male has a 1 week history of left forearm insect bite.  He has been seeing lots of spiders around his home and gardening.  The bite started with some red area small pustules and then developed some drainage.  He applied Neosporin and now black drawing bradley.  He did not have any fever or systemic symptoms.  No streaking up the arm.      Hospital Outpatient Visit on 05/15/2020   Component Date Value Ref Range Status    POC Glucose 05/15/2020 100  70 - 110 Final       Past Medical History:   Diagnosis Date    Cancer 2013    melanoma left forearm, Dr Pancho Gonsalez CVA (cerebral infarction) 2011    Hypercholesteremia     Hyperlipemia     Melanoma     left arm    Prostate cancer     Thyroid disease      Social History     Socioeconomic History    Marital status:      Spouse name: Not on file    Number of children: 2    Years of education: Not on file    Highest education level: Not on file   Occupational History    Occupation:    Social Needs    Financial resource strain: Not on file    Food insecurity     Worry: Not on file     Inability: Not on file    Transportation needs     Medical: Not on file     Non-medical: Not on file   Tobacco Use    Smoking status: Former Smoker     Types: Cigarettes     Quit date: 1968     Years since quittin.7    Smokeless tobacco: Current User     Types: Snuff   Substance and Sexual Activity    Alcohol use: Yes     Alcohol/week: 2.0 standard drinks     Types: 2 Cans of beer per week     Comment: Daily    Drug use: No    Sexual activity: Not on file   Lifestyle    Physical activity     Days per week: Not on file     Minutes per session: Not on file    Stress: Not on file   Relationships    Social connections     Talks on phone: Not on file     Gets together: Not  on file     Attends [employer] service: Not on file     Active member of club or organization: Not on file     Attends meetings of clubs or organizations: Not on file     Relationship status: Not on file   Other Topics Concern    Not on file   Social History Narrative    Not on file     Past Surgical History:   Procedure Laterality Date    LYMPHADENECTOMY      left axillary-Dr. Steiner     Family History   Problem Relation Age of Onset    Hypertension Mother     Heart disease Father     Hypertension Father     Heart disease Brother     Heart disease Sister     Cancer Paternal Grandfather         prostate       Review of patient's allergies indicates:   Allergen Reactions    No known drug allergies        Current Outpatient Medications:     aspirin (ECOTRIN) 81 MG EC tablet, Take 81 mg by mouth once daily.  , Disp: , Rfl:     atorvastatin (LIPITOR) 80 MG tablet, Take 1 tablet (80 mg total) by mouth once daily., Disp: 90 tablet, Rfl: 1    CALCIUM CITRATE/VITAMIN D3 (CALCIUM CITRATE + D ORAL), Take 3 tablets by mouth once daily., Disp: , Rfl:     cyanocobalamin, vitamin B-12, 5,000 mcg TbDL, Take 1 tablet by mouth once a week., Disp: , Rfl:     doxycycline (MONODOX) 100 MG capsule, Take 1 capsule (100 mg total) by mouth 2 times daily 2 hours after meal., Disp: 20 capsule, Rfl: 0    HYDROcodone-acetaminophen (NORCO) 5-325 mg per tablet, Take 1 tablet by mouth every 12 (twelve) hours as needed for Pain., Disp: 60 tablet, Rfl: 0    IRON/VITAMIN B COMPLEX (GERITOL ORAL), Take 1 capsule by mouth nightly., Disp: , Rfl:     levothyroxine (SYNTHROID) 50 MCG tablet, Take 1 tablet (50 mcg total) by mouth before breakfast., Disp: 90 tablet, Rfl: 1    MAGNESIUM ORAL, Take 2 tablets by mouth once daily. , Disp: , Rfl:     sildenafil (VIAGRA) 100 MG tablet, Take 1 tablet (100 mg total) by mouth as needed for Erectile Dysfunction., Disp: 10 tablet, Rfl: 11    UBIDECARENONE (COQ-10 ORAL), Take 1 tablet by mouth  "once daily., Disp: , Rfl:     Review of Systems       Objective:      Vitals:    10/01/20 0936   BP: 132/70   Pulse: 68   Temp: 97.3 °F (36.3 °C)   Weight: 84.8 kg (187 lb)   Height: 5' 8" (1.727 m)     Physical Exam  Skin:     Comments: 1.5 x 1.5 cm dusky lesion with necrotic center, no beth pus, no redness surrounding or no streaking up the arm           Assessment:       1. Spider bite wound, undetermined intent, initial encounter         Plan:       Spider bite wound, undetermined intent, initial encounter  -     doxycycline (MONODOX) 100 MG capsule; Take 1 capsule (100 mg total) by mouth 2 times daily 2 hours after meal.  Dispense: 20 capsule; Refill: 0  Continue local skin care and cleansing.  Add doxycycline 100 mg p.o. b.i.d.    Follow up if symptoms worsen or fail to improve.        10/1/2020 David Viera       "

## 2020-11-04 ENCOUNTER — TELEPHONE (OUTPATIENT)
Dept: FAMILY MEDICINE | Facility: CLINIC | Age: 75
End: 2020-11-04

## 2020-11-04 DIAGNOSIS — Z79.899 ENCOUNTER FOR LONG-TERM (CURRENT) USE OF OTHER MEDICATIONS: ICD-10-CM

## 2020-11-04 DIAGNOSIS — E78.00 HYPERCHOLESTEREMIA: ICD-10-CM

## 2020-11-04 DIAGNOSIS — E03.9 ACQUIRED HYPOTHYROIDISM: Primary | ICD-10-CM

## 2020-11-04 NOTE — TELEPHONE ENCOUNTER
LMOR to call me back, no comm consent, so that I can remind him fasting lab is due a week prior to 11/18 ov. Orders are at [facility].

## 2020-11-09 ENCOUNTER — TELEPHONE (OUTPATIENT)
Dept: FAMILY MEDICINE | Facility: CLINIC | Age: 75
End: 2020-11-09

## 2020-11-09 DIAGNOSIS — Z79.899 ENCOUNTER FOR LONG-TERM (CURRENT) USE OF OTHER MEDICATIONS: Primary | ICD-10-CM

## 2020-11-09 DIAGNOSIS — E03.9 ACQUIRED HYPOTHYROIDISM: ICD-10-CM

## 2020-11-09 DIAGNOSIS — C61 PROSTATE CANCER: ICD-10-CM

## 2020-11-09 DIAGNOSIS — Z00.00 ROUTINE GENERAL MEDICAL EXAMINATION AT A HEALTH CARE FACILITY: ICD-10-CM

## 2020-11-09 NOTE — TELEPHONE ENCOUNTER
----- Message from Cristiana Reid sent at 11/9/2020  9:12 AM CST -----  Pt's wife is calling she is requesting a PSA be added to the pt's lab work.  She also stated that all labs will be done at [email or web address].   # [phone]

## 2020-11-12 LAB
ALBUMIN SERPL-MCNC: 4.3 G/DL (ref 3.7–4.7)
ALBUMIN/GLOB SERPL: 1.4 {RATIO} (ref 1.2–2.2)
ALP SERPL-CCNC: 82 IU/L (ref 39–117)
ALT SERPL-CCNC: 28 IU/L (ref 0–44)
AST SERPL-CCNC: 22 IU/L (ref 0–40)
BASOPHILS # BLD AUTO: 0 X10E3/UL (ref 0–0.2)
BASOPHILS NFR BLD AUTO: 1 %
BILIRUB SERPL-MCNC: 0.5 MG/DL (ref 0–1.2)
BUN SERPL-MCNC: 18 MG/DL (ref 8–27)
BUN/CREAT SERPL: 20 (ref 10–24)
CALCIUM SERPL-MCNC: 9.1 MG/DL (ref 8.6–10.2)
CHLORIDE SERPL-SCNC: 105 MMOL/L (ref 96–106)
CHOLEST SERPL-MCNC: 147 MG/DL (ref 100–199)
CO2 SERPL-SCNC: 23 MMOL/L (ref 20–29)
CREAT SERPL-MCNC: 0.91 MG/DL (ref 0.76–1.27)
EOSINOPHIL # BLD AUTO: 0.2 X10E3/UL (ref 0–0.4)
EOSINOPHIL NFR BLD AUTO: 4 %
ERYTHROCYTE [DISTWIDTH] IN BLOOD BY AUTOMATED COUNT: 13 % (ref 11.6–15.4)
GLOBULIN SER CALC-MCNC: 3.1 G/DL (ref 1.5–4.5)
GLUCOSE SERPL-MCNC: 106 MG/DL (ref 65–99)
HCT VFR BLD AUTO: 46 % (ref 37.5–51)
HDLC SERPL-MCNC: 44 MG/DL
HGB BLD-MCNC: 14.6 G/DL (ref 13–17.7)
IMM GRANULOCYTES # BLD AUTO: 0 X10E3/UL (ref 0–0.1)
IMM GRANULOCYTES NFR BLD AUTO: 0 %
LDLC SERPL CALC-MCNC: 87 MG/DL (ref 0–99)
LYMPHOCYTES # BLD AUTO: 2.5 X10E3/UL (ref 0.7–3.1)
LYMPHOCYTES NFR BLD AUTO: 44 %
MCH RBC QN AUTO: 30.5 PG (ref 26.6–33)
MCHC RBC AUTO-ENTMCNC: 31.7 G/DL (ref 31.5–35.7)
MCV RBC AUTO: 96 FL (ref 79–97)
MONOCYTES # BLD AUTO: 0.6 X10E3/UL (ref 0.1–0.9)
MONOCYTES NFR BLD AUTO: 11 %
NEUTROPHILS # BLD AUTO: 2.2 X10E3/UL (ref 1.4–7)
NEUTROPHILS NFR BLD AUTO: 40 %
PLATELET # BLD AUTO: 329 X10E3/UL (ref 150–450)
POTASSIUM SERPL-SCNC: 4.9 MMOL/L (ref 3.5–5.2)
PROT SERPL-MCNC: 7.4 G/DL (ref 6–8.5)
RBC # BLD AUTO: 4.79 X10E6/UL (ref 4.14–5.8)
SODIUM SERPL-SCNC: 139 MMOL/L (ref 134–144)
TRIGL SERPL-MCNC: 81 MG/DL (ref 0–149)
TSH SERPL DL<=0.005 MIU/L-ACNC: 2.99 UIU/ML (ref 0.45–4.5)
VLDLC SERPL CALC-MCNC: 16 MG/DL (ref 5–40)
WBC # BLD AUTO: 5.5 X10E3/UL (ref 3.4–10.8)

## 2020-11-18 ENCOUNTER — OFFICE VISIT (OUTPATIENT)
Dept: FAMILY MEDICINE | Facility: CLINIC | Age: 75
End: 2020-11-18
Payer: MEDICARE

## 2020-11-18 VITALS
HEIGHT: 68 IN | SYSTOLIC BLOOD PRESSURE: 132 MMHG | DIASTOLIC BLOOD PRESSURE: 80 MMHG | BODY MASS INDEX: 28.19 KG/M2 | HEART RATE: 72 BPM | WEIGHT: 186 LBS

## 2020-11-18 DIAGNOSIS — M17.0 PRIMARY OSTEOARTHRITIS OF BOTH KNEES: ICD-10-CM

## 2020-11-18 DIAGNOSIS — C61 PROSTATE CANCER: ICD-10-CM

## 2020-11-18 DIAGNOSIS — E78.00 HYPERCHOLESTEREMIA: ICD-10-CM

## 2020-11-18 DIAGNOSIS — E03.9 ACQUIRED HYPOTHYROIDISM: Primary | ICD-10-CM

## 2020-11-18 DIAGNOSIS — I63.9 CEREBRAL INFARCTION, UNSPECIFIED MECHANISM: ICD-10-CM

## 2020-11-18 PROCEDURE — 1159F PR MEDICATION LIST DOCUMENTED IN MEDICAL RECORD: ICD-10-PCS | Mod: S$GLB,,, | Performed by: FAMILY MEDICINE

## 2020-11-18 PROCEDURE — 99214 OFFICE O/P EST MOD 30 MIN: CPT | Mod: S$GLB,,, | Performed by: FAMILY MEDICINE

## 2020-11-18 PROCEDURE — 1159F MED LIST DOCD IN RCRD: CPT | Mod: S$GLB,,, | Performed by: FAMILY MEDICINE

## 2020-11-18 PROCEDURE — 99214 PR OFFICE/OUTPT VISIT, EST, LEVL IV, 30-39 MIN: ICD-10-PCS | Mod: S$GLB,,, | Performed by: FAMILY MEDICINE

## 2020-11-18 RX ORDER — HYDROCODONE BITARTRATE AND ACETAMINOPHEN 5; 325 MG/1; MG/1
1 TABLET ORAL EVERY 12 HOURS PRN
Qty: 60 TABLET | Refills: 0 | Status: SHIPPED | OUTPATIENT
Start: 2020-11-18 | End: 2021-01-06 | Stop reason: SDUPTHER

## 2020-11-18 RX ORDER — LEVOTHYROXINE SODIUM 50 UG/1
50 TABLET ORAL
Qty: 90 TABLET | Refills: 1 | Status: SHIPPED | OUTPATIENT
Start: 2020-11-18 | End: 2021-05-18 | Stop reason: SDUPTHER

## 2020-11-18 RX ORDER — ATORVASTATIN CALCIUM 80 MG/1
80 TABLET, FILM COATED ORAL DAILY
Qty: 90 TABLET | Refills: 1 | Status: SHIPPED | OUTPATIENT
Start: 2020-11-18 | End: 2021-05-18 | Stop reason: SDUPTHER
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
## 2020-12-09 ENCOUNTER — TELEPHONE (OUTPATIENT)
Dept: FAMILY MEDICINE | Facility: CLINIC | Age: 75
End: 2020-12-09

## 2020-12-09 NOTE — TELEPHONE ENCOUNTER
Patient's PSA came up in overdue results. Looks like all labs were drawn that day except his PSA. LMOR for patient to call me back to see if he knew it wasn't drawn. Informed him my call was not urgent. No comm consent to leave details of my call

## 2020-12-09 NOTE — TELEPHONE ENCOUNTER
Patient called back and said that he doesn't know why the PSA wasn't drawn, that he even told [facility] that he wanted to be sure they had PSA order and they did. States he spoke to Dr Viera about this and he told Dr Viera he didn't want to get stuck again this year.

## 2021-01-06 DIAGNOSIS — M17.0 PRIMARY OSTEOARTHRITIS OF BOTH KNEES: ICD-10-CM

## 2021-01-06 RX ORDER — HYDROCODONE BITARTRATE AND ACETAMINOPHEN 5; 325 MG/1; MG/1
1 TABLET ORAL EVERY 12 HOURS PRN
Qty: 60 TABLET | Refills: 0 | Status: SHIPPED | OUTPATIENT
Start: 2021-01-06 | End: 2021-03-16 | Stop reason: SDUPTHER

## 2021-02-12 RX ORDER — METHYLPREDNISOLONE 4 MG/1
TABLET ORAL
Qty: 1 PACKAGE | Refills: 0 | Status: SHIPPED | OUTPATIENT
Start: 2021-02-12 | End: 2021-03-05

## 2021-02-12 RX ORDER — TRAMADOL HYDROCHLORIDE 50 MG/1
50 TABLET ORAL EVERY 12 HOURS PRN
Qty: 30 TABLET | Refills: 0 | Status: SHIPPED | OUTPATIENT
Start: 2021-02-12 | End: 2021-05-18 | Stop reason: SDUPTHER

## 2021-02-15 ENCOUNTER — OFFICE VISIT (OUTPATIENT)
Dept: FAMILY MEDICINE | Facility: CLINIC | Age: 76
End: 2021-02-15
Payer: MEDICARE

## 2021-02-15 VITALS
WEIGHT: 187 LBS | BODY MASS INDEX: 28.34 KG/M2 | DIASTOLIC BLOOD PRESSURE: 72 MMHG | HEART RATE: 72 BPM | SYSTOLIC BLOOD PRESSURE: 118 MMHG | HEIGHT: 68 IN

## 2021-02-15 DIAGNOSIS — C61 PROSTATE CANCER: ICD-10-CM

## 2021-02-15 DIAGNOSIS — C44.622 SQUAMOUS CELL CARCINOMA OF MULTIPLE SITES OF SKIN OF RIGHT UPPER ARM: ICD-10-CM

## 2021-02-15 DIAGNOSIS — B02.8 HERPES ZOSTER WITH OTHER COMPLICATION: Primary | ICD-10-CM

## 2021-02-15 PROCEDURE — 99213 OFFICE O/P EST LOW 20 MIN: CPT | Mod: S$GLB,,, | Performed by: FAMILY MEDICINE

## 2021-02-15 PROCEDURE — 1159F PR MEDICATION LIST DOCUMENTED IN MEDICAL RECORD: ICD-10-PCS | Mod: S$GLB,,, | Performed by: FAMILY MEDICINE

## 2021-02-15 PROCEDURE — 1159F MED LIST DOCD IN RCRD: CPT | Mod: S$GLB,,, | Performed by: FAMILY MEDICINE

## 2021-02-15 PROCEDURE — 99213 PR OFFICE/OUTPT VISIT, EST, LEVL III, 20-29 MIN: ICD-10-PCS | Mod: S$GLB,,, | Performed by: FAMILY MEDICINE

## 2021-02-15 RX ORDER — GABAPENTIN 100 MG/1
100 CAPSULE ORAL 3 TIMES DAILY
Qty: 30 CAPSULE | Refills: 1 | Status: SHIPPED | OUTPATIENT
Start: 2021-02-15 | End: 2021-05-18

## 2021-02-15 RX ORDER — VALACYCLOVIR HYDROCHLORIDE 1 G/1
1000 TABLET, FILM COATED ORAL 3 TIMES DAILY
Qty: 90 TABLET | Refills: 11 | Status: SHIPPED | OUTPATIENT
Start: 2021-02-15 | End: 2022-02-15

## 2021-02-19 ENCOUNTER — PES CALL (OUTPATIENT)
Dept: ADMINISTRATIVE | Facility: CLINIC | Age: 76
End: 2021-02-19

## 2021-03-16 DIAGNOSIS — M17.0 PRIMARY OSTEOARTHRITIS OF BOTH KNEES: ICD-10-CM

## 2021-03-16 RX ORDER — HYDROCODONE BITARTRATE AND ACETAMINOPHEN 5; 325 MG/1; MG/1
1 TABLET ORAL EVERY 12 HOURS PRN
Qty: 60 TABLET | Refills: 0 | Status: SHIPPED | OUTPATIENT
Start: 2021-03-16 | End: 2021-11-18 | Stop reason: SDUPTHER

## 2021-05-04 ENCOUNTER — TELEPHONE (OUTPATIENT)
Dept: FAMILY MEDICINE | Facility: CLINIC | Age: 76
End: 2021-05-04

## 2021-05-04 DIAGNOSIS — E03.9 ACQUIRED HYPOTHYROIDISM: ICD-10-CM

## 2021-05-04 DIAGNOSIS — E78.00 HYPERCHOLESTEREMIA: ICD-10-CM

## 2021-05-04 DIAGNOSIS — Z79.899 ENCOUNTER FOR LONG-TERM (CURRENT) USE OF OTHER MEDICATIONS: Primary | ICD-10-CM

## 2021-05-06 ENCOUNTER — TELEPHONE (OUTPATIENT)
Dept: FAMILY MEDICINE | Facility: CLINIC | Age: 76
End: 2021-05-06

## 2021-05-06 DIAGNOSIS — Z79.899 ENCOUNTER FOR LONG-TERM (CURRENT) USE OF OTHER MEDICATIONS: ICD-10-CM

## 2021-05-06 DIAGNOSIS — E78.00 HYPERCHOLESTEREMIA: ICD-10-CM

## 2021-05-06 DIAGNOSIS — Z00.00 ROUTINE GENERAL MEDICAL EXAMINATION AT A HEALTH CARE FACILITY: Primary | ICD-10-CM

## 2021-05-06 DIAGNOSIS — C61 PROSTATE CANCER: ICD-10-CM

## 2021-05-06 DIAGNOSIS — E03.9 ACQUIRED HYPOTHYROIDISM: ICD-10-CM

## 2021-05-15 LAB
ALBUMIN SERPL-MCNC: 4 G/DL (ref 3.7–4.7)
ALBUMIN/GLOB SERPL: 1.2 {RATIO} (ref 1.2–2.2)
ALP SERPL-CCNC: 84 IU/L (ref 39–117)
ALT SERPL-CCNC: 15 IU/L (ref 0–44)
APPEARANCE UR: CLEAR
AST SERPL-CCNC: 19 IU/L (ref 0–40)
BACTERIA #/AREA URNS HPF: NORMAL /[HPF]
BASOPHILS # BLD AUTO: 0 X10E3/UL (ref 0–0.2)
BASOPHILS NFR BLD AUTO: 1 %
BILIRUB SERPL-MCNC: 0.3 MG/DL (ref 0–1.2)
BILIRUB UR QL STRIP: NEGATIVE
BUN SERPL-MCNC: 14 MG/DL (ref 8–27)
BUN/CREAT SERPL: 15 (ref 10–24)
CALCIUM SERPL-MCNC: 9.6 MG/DL (ref 8.6–10.2)
CASTS URNS QL MICRO: NORMAL /LPF
CHLORIDE SERPL-SCNC: 105 MMOL/L (ref 96–106)
CHOLEST SERPL-MCNC: 257 MG/DL (ref 100–199)
CO2 SERPL-SCNC: 25 MMOL/L (ref 20–29)
COLOR UR: YELLOW
CREAT SERPL-MCNC: 0.96 MG/DL (ref 0.76–1.27)
EOSINOPHIL # BLD AUTO: 0.2 X10E3/UL (ref 0–0.4)
EOSINOPHIL NFR BLD AUTO: 4 %
EPI CELLS #/AREA URNS HPF: NORMAL /HPF (ref 0–10)
ERYTHROCYTE [DISTWIDTH] IN BLOOD BY AUTOMATED COUNT: 14 % (ref 11.6–15.4)
GLOBULIN SER CALC-MCNC: 3.4 G/DL (ref 1.5–4.5)
GLUCOSE SERPL-MCNC: 116 MG/DL (ref 65–99)
GLUCOSE UR QL: NEGATIVE
HCT VFR BLD AUTO: 46.7 % (ref 37.5–51)
HDLC SERPL-MCNC: 37 MG/DL
HGB BLD-MCNC: 15.5 G/DL (ref 13–17.7)
HGB UR QL STRIP: NEGATIVE
IMM GRANULOCYTES # BLD AUTO: 0 X10E3/UL (ref 0–0.1)
IMM GRANULOCYTES NFR BLD AUTO: 0 %
KETONES UR QL STRIP: NEGATIVE
LDLC SERPL CALC-MCNC: 198 MG/DL (ref 0–99)
LEUKOCYTE ESTERASE UR QL STRIP: NEGATIVE
LYMPHOCYTES # BLD AUTO: 2.2 X10E3/UL (ref 0.7–3.1)
LYMPHOCYTES NFR BLD AUTO: 43 %
MCH RBC QN AUTO: 32.4 PG (ref 26.6–33)
MCHC RBC AUTO-ENTMCNC: 33.2 G/DL (ref 31.5–35.7)
MCV RBC AUTO: 98 FL (ref 79–97)
MICRO URNS: NORMAL
MICRO URNS: NORMAL
MONOCYTES # BLD AUTO: 0.6 X10E3/UL (ref 0.1–0.9)
MONOCYTES NFR BLD AUTO: 11 %
NEUTROPHILS # BLD AUTO: 2 X10E3/UL (ref 1.4–7)
NEUTROPHILS NFR BLD AUTO: 41 %
NITRITE UR QL STRIP: NEGATIVE
PH UR STRIP: 5.5 [PH] (ref 5–7.5)
PLATELET # BLD AUTO: 369 X10E3/UL (ref 150–450)
POTASSIUM SERPL-SCNC: 4.9 MMOL/L (ref 3.5–5.2)
PROT SERPL-MCNC: 7.4 G/DL (ref 6–8.5)
PROT UR QL STRIP: NEGATIVE
PSA FREE MFR SERPL: NORMAL %
PSA FREE SERPL-MCNC: <0.02 NG/ML
PSA SERPL-MCNC: <0.1 NG/ML (ref 0–4)
RBC # BLD AUTO: 4.79 X10E6/UL (ref 4.14–5.8)
RBC #/AREA URNS HPF: NORMAL /HPF (ref 0–2)
SODIUM SERPL-SCNC: 143 MMOL/L (ref 134–144)
SP GR UR: 1.02 (ref 1–1.03)
T4 FREE SERPL-MCNC: 0.8 NG/DL (ref 0.82–1.77)
TRIGL SERPL-MCNC: 122 MG/DL (ref 0–149)
TSH SERPL DL<=0.005 MIU/L-ACNC: 7.82 UIU/ML (ref 0.45–4.5)
URINALYSIS REFLEX: NORMAL
UROBILINOGEN UR STRIP-MCNC: 0.2 MG/DL (ref 0.2–1)
VLDLC SERPL CALC-MCNC: 22 MG/DL (ref 5–40)
WBC # BLD AUTO: 5 X10E3/UL (ref 3.4–10.8)
WBC #/AREA URNS HPF: NORMAL /HPF (ref 0–5)

## 2021-05-17 ENCOUNTER — TELEPHONE (OUTPATIENT)
Dept: FAMILY MEDICINE | Facility: CLINIC | Age: 76
End: 2021-05-17

## 2021-05-18 ENCOUNTER — OFFICE VISIT (OUTPATIENT)
Dept: FAMILY MEDICINE | Facility: CLINIC | Age: 76
End: 2021-05-18
Payer: MEDICARE

## 2021-05-18 VITALS
HEART RATE: 70 BPM | SYSTOLIC BLOOD PRESSURE: 130 MMHG | WEIGHT: 194 LBS | BODY MASS INDEX: 29.4 KG/M2 | DIASTOLIC BLOOD PRESSURE: 88 MMHG | HEIGHT: 68 IN

## 2021-05-18 DIAGNOSIS — E03.9 ACQUIRED HYPOTHYROIDISM: ICD-10-CM

## 2021-05-18 DIAGNOSIS — B02.29 HZV (HERPES ZOSTER VIRUS) POST HERPETIC NEURALGIA: Primary | ICD-10-CM

## 2021-05-18 DIAGNOSIS — C43.62 MALIGNANT MELANOMA OF LEFT UPPER EXTREMITY INCLUDING SHOULDER: ICD-10-CM

## 2021-05-18 DIAGNOSIS — E78.00 HYPERCHOLESTEREMIA: ICD-10-CM

## 2021-05-18 DIAGNOSIS — I63.9 CEREBRAL INFARCTION, UNSPECIFIED MECHANISM: ICD-10-CM

## 2021-05-18 DIAGNOSIS — C61 PROSTATE CANCER: ICD-10-CM

## 2021-05-18 DIAGNOSIS — M17.0 PRIMARY OSTEOARTHRITIS OF BOTH KNEES: ICD-10-CM

## 2021-05-18 PROCEDURE — 1159F PR MEDICATION LIST DOCUMENTED IN MEDICAL RECORD: ICD-10-PCS | Mod: S$GLB,,, | Performed by: FAMILY MEDICINE

## 2021-05-18 PROCEDURE — 3288F PR FALLS RISK ASSESSMENT DOCUMENTED: ICD-10-PCS | Mod: S$GLB,,, | Performed by: FAMILY MEDICINE

## 2021-05-18 PROCEDURE — 1101F PT FALLS ASSESS-DOCD LE1/YR: CPT | Mod: S$GLB,,, | Performed by: FAMILY MEDICINE

## 2021-05-18 PROCEDURE — 99214 PR OFFICE/OUTPT VISIT, EST, LEVL IV, 30-39 MIN: ICD-10-PCS | Mod: S$GLB,,, | Performed by: FAMILY MEDICINE

## 2021-05-18 PROCEDURE — 1159F MED LIST DOCD IN RCRD: CPT | Mod: S$GLB,,, | Performed by: FAMILY MEDICINE

## 2021-05-18 PROCEDURE — 99214 OFFICE O/P EST MOD 30 MIN: CPT | Mod: S$GLB,,, | Performed by: FAMILY MEDICINE

## 2021-05-18 PROCEDURE — 1101F PR PT FALLS ASSESS DOC 0-1 FALLS W/OUT INJ PAST YR: ICD-10-PCS | Mod: S$GLB,,, | Performed by: FAMILY MEDICINE

## 2021-05-18 PROCEDURE — 3288F FALL RISK ASSESSMENT DOCD: CPT | Mod: S$GLB,,, | Performed by: FAMILY MEDICINE

## 2021-05-18 RX ORDER — ATORVASTATIN CALCIUM 80 MG/1
80 TABLET, FILM COATED ORAL DAILY
Qty: 90 TABLET | Refills: 1 | Status: SHIPPED | OUTPATIENT
Start: 2021-05-18 | End: 2021-11-18 | Stop reason: SDUPTHER

## 2021-05-18 RX ORDER — LEVOTHYROXINE SODIUM 50 UG/1
50 TABLET ORAL
Qty: 90 TABLET | Refills: 1 | Status: SHIPPED | OUTPATIENT
Start: 2021-05-18 | End: 2021-11-18 | Stop reason: SDUPTHER

## 2021-05-18 RX ORDER — GABAPENTIN 600 MG/1
600 TABLET ORAL 3 TIMES DAILY
Qty: 90 TABLET | Refills: 4 | Status: SHIPPED | OUTPATIENT
Start: 2021-05-18 | End: 2022-05-18

## 2021-05-18 RX ORDER — TRAMADOL HYDROCHLORIDE 50 MG/1
50 TABLET ORAL EVERY 12 HOURS PRN
Qty: 50 TABLET | Refills: 0 | Status: SHIPPED | OUTPATIENT
Start: 2021-05-18

## 2021-05-18 RX ORDER — LIDOCAINE HYDROCHLORIDE 40 MG/ML
4 SOLUTION TOPICAL
COMMUNITY

## 2021-11-04 ENCOUNTER — TELEPHONE (OUTPATIENT)
Dept: FAMILY MEDICINE | Facility: CLINIC | Age: 76
End: 2021-11-04
Payer: MEDICARE

## 2021-11-04 DIAGNOSIS — E78.00 HYPERCHOLESTEREMIA: ICD-10-CM

## 2021-11-04 DIAGNOSIS — Z79.899 ENCOUNTER FOR LONG-TERM (CURRENT) USE OF OTHER MEDICATIONS: Primary | ICD-10-CM

## 2021-11-17 ENCOUNTER — TELEPHONE (OUTPATIENT)
Dept: FAMILY MEDICINE | Facility: CLINIC | Age: 76
End: 2021-11-17
Payer: MEDICARE

## 2021-11-17 DIAGNOSIS — E78.00 HYPERCHOLESTEREMIA: Primary | ICD-10-CM

## 2021-11-17 DIAGNOSIS — Z79.899 ENCOUNTER FOR LONG-TERM (CURRENT) USE OF OTHER MEDICATIONS: ICD-10-CM

## 2021-11-18 ENCOUNTER — OFFICE VISIT (OUTPATIENT)
Dept: FAMILY MEDICINE | Facility: CLINIC | Age: 76
End: 2021-11-18
Payer: MEDICARE

## 2021-11-18 VITALS
HEIGHT: 68 IN | BODY MASS INDEX: 28.95 KG/M2 | WEIGHT: 191 LBS | SYSTOLIC BLOOD PRESSURE: 120 MMHG | DIASTOLIC BLOOD PRESSURE: 84 MMHG | HEART RATE: 68 BPM

## 2021-11-18 DIAGNOSIS — B02.29 PHN (POSTHERPETIC NEURALGIA): ICD-10-CM

## 2021-11-18 DIAGNOSIS — E03.9 ACQUIRED HYPOTHYROIDISM: ICD-10-CM

## 2021-11-18 DIAGNOSIS — E78.00 HYPERCHOLESTEREMIA: Primary | ICD-10-CM

## 2021-11-18 DIAGNOSIS — Z85.46 HISTORY OF PROSTATE CANCER: ICD-10-CM

## 2021-11-18 DIAGNOSIS — M17.0 PRIMARY OSTEOARTHRITIS OF BOTH KNEES: ICD-10-CM

## 2021-11-18 DIAGNOSIS — I63.9 CEREBRAL INFARCTION, UNSPECIFIED MECHANISM: ICD-10-CM

## 2021-11-18 LAB
ALBUMIN SERPL-MCNC: 4.5 G/DL (ref 3.7–4.7)
ALBUMIN/GLOB SERPL: 1.5 {RATIO} (ref 1.2–2.2)
ALP SERPL-CCNC: 90 IU/L (ref 44–121)
ALT SERPL-CCNC: 18 IU/L (ref 0–44)
AST SERPL-CCNC: 16 IU/L (ref 0–40)
BILIRUB SERPL-MCNC: 0.5 MG/DL (ref 0–1.2)
BUN SERPL-MCNC: 12 MG/DL (ref 8–27)
BUN/CREAT SERPL: 13 (ref 10–24)
CALCIUM SERPL-MCNC: 9.3 MG/DL (ref 8.6–10.2)
CHLORIDE SERPL-SCNC: 105 MMOL/L (ref 96–106)
CHOLEST SERPL-MCNC: 154 MG/DL (ref 100–199)
CO2 SERPL-SCNC: 22 MMOL/L (ref 20–29)
CREAT SERPL-MCNC: 0.9 MG/DL (ref 0.76–1.27)
GLOBULIN SER CALC-MCNC: 3 G/DL (ref 1.5–4.5)
GLUCOSE SERPL-MCNC: 113 MG/DL (ref 65–99)
HDLC SERPL-MCNC: 41 MG/DL
LDLC SERPL CALC-MCNC: 94 MG/DL (ref 0–99)
POTASSIUM SERPL-SCNC: 4.4 MMOL/L (ref 3.5–5.2)
PROT SERPL-MCNC: 7.5 G/DL (ref 6–8.5)
SODIUM SERPL-SCNC: 140 MMOL/L (ref 134–144)
TRIGL SERPL-MCNC: 105 MG/DL (ref 0–149)
VLDLC SERPL CALC-MCNC: 19 MG/DL (ref 5–40)

## 2021-11-18 PROCEDURE — 3074F PR MOST RECENT SYSTOLIC BLOOD PRESSURE < 130 MM HG: ICD-10-PCS | Mod: S$GLB,,, | Performed by: FAMILY MEDICINE

## 2021-11-18 PROCEDURE — 1101F PR PT FALLS ASSESS DOC 0-1 FALLS W/OUT INJ PAST YR: ICD-10-PCS | Mod: S$GLB,,, | Performed by: FAMILY MEDICINE

## 2021-11-18 PROCEDURE — 3079F DIAST BP 80-89 MM HG: CPT | Mod: S$GLB,,, | Performed by: FAMILY MEDICINE

## 2021-11-18 PROCEDURE — 3079F PR MOST RECENT DIASTOLIC BLOOD PRESSURE 80-89 MM HG: ICD-10-PCS | Mod: S$GLB,,, | Performed by: FAMILY MEDICINE

## 2021-11-18 PROCEDURE — 1101F PT FALLS ASSESS-DOCD LE1/YR: CPT | Mod: S$GLB,,, | Performed by: FAMILY MEDICINE

## 2021-11-18 PROCEDURE — 3288F PR FALLS RISK ASSESSMENT DOCUMENTED: ICD-10-PCS | Mod: S$GLB,,, | Performed by: FAMILY MEDICINE

## 2021-11-18 PROCEDURE — 3074F SYST BP LT 130 MM HG: CPT | Mod: S$GLB,,, | Performed by: FAMILY MEDICINE

## 2021-11-18 PROCEDURE — 99214 OFFICE O/P EST MOD 30 MIN: CPT | Mod: S$GLB,,, | Performed by: FAMILY MEDICINE

## 2021-11-18 PROCEDURE — 3288F FALL RISK ASSESSMENT DOCD: CPT | Mod: S$GLB,,, | Performed by: FAMILY MEDICINE

## 2021-11-18 PROCEDURE — 99214 PR OFFICE/OUTPT VISIT, EST, LEVL IV, 30-39 MIN: ICD-10-PCS | Mod: S$GLB,,, | Performed by: FAMILY MEDICINE

## 2021-11-18 RX ORDER — LEVOTHYROXINE SODIUM 50 UG/1
50 TABLET ORAL
Qty: 90 TABLET | Refills: 1 | Status: SHIPPED | OUTPATIENT
Start: 2021-11-18 | End: 2022-05-23 | Stop reason: SDUPTHER

## 2021-11-18 RX ORDER — HYDROCODONE BITARTRATE AND ACETAMINOPHEN 5; 325 MG/1; MG/1
1 TABLET ORAL EVERY 12 HOURS PRN
Qty: 60 TABLET | Refills: 0 | Status: SHIPPED | OUTPATIENT
Start: 2021-11-18 | End: 2022-05-23 | Stop reason: SDUPTHER

## 2021-11-18 RX ORDER — ATORVASTATIN CALCIUM 80 MG/1
80 TABLET, FILM COATED ORAL DAILY
Qty: 90 TABLET | Refills: 1 | Status: SHIPPED | OUTPATIENT
Start: 2021-11-18 | End: 2022-05-23 | Stop reason: SDUPTHER

## 2021-11-22 ENCOUNTER — TELEPHONE (OUTPATIENT)
Dept: FAMILY MEDICINE | Facility: CLINIC | Age: 76
End: 2021-11-22
Payer: MEDICARE

## 2021-12-27 ENCOUNTER — TELEPHONE (OUTPATIENT)
Dept: FAMILY MEDICINE | Facility: CLINIC | Age: 76
End: 2021-12-27
Payer: MEDICARE

## 2022-05-09 NOTE — TELEPHONE ENCOUNTER
----- Message from Kassie Cota sent at 5/23/2018 12:19 PM CDT -----  Dr. William called in to speak with Dr. Boyle the patient is in the office. Dr. William would like to speak with Dr. Boyle before the patient leaves. Please contact Dr. William on his cell phone at [phone]. Thanks      59 yo male      with a PMHx of cerebellar ataxia     who was brought to the ED on 4/17 due to AMS preceded by 1 week of fatigue/weakness and episodic incontinence.      was  intubated for worsening clinical level depressed consciousness w/ cognitive decline.     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   and  infectious  and  malignancy  was  ruled  outt/  and  per   neuro  note ,no further workup at this time from a neurology standpoint   flow cytometry 4/28 showing nonspecific results 'degenerated sample, small lymphocytes'   nsgy consulted for meningeal bx, family refusing    CSF cytopathology 4/22 - increased number of large mononuclear cells in a background of few small lymphocytes, monocytes and neutrophils, cannot exclude a lymphoproliferative disorder versus an inflammatory process.    CSF cytopathology 4/29 - significant increased number of small lymphocytes with rare monocytes/ seen by  oncology  dr de leon,  no  intervention     h/o  cerebellar  ataxia,  with  no defined  cause  found  had  extensive  w/p  in icu.  with normal  cortisol  level  pt  is  alert,  able   to  speak,  comprehend  and  move  his  limbs   pt  with  bradycardia, ?  central, seen  by  card/  echo  on 4/2022,  normal  ef   now  with  hypothermia, follow  bcx,   f/p  by  house  ID  prior  CT  c/ap,  no  malignant  process   most  of  these  issues, do  not  have   a good  rx  per  swallow  eval, on  modified  diet/  fs  noted  endo  dr ovalle  bcx   are negative   plan,   discussed  with wife  at  length,  if  cx  are negative,  then  start   d/c planning next  week/  wige  wants  rehab   [facility] ,and  then proceed  with   d/c  to rehab/ team  [facility]

## 2022-05-17 ENCOUNTER — TELEPHONE (OUTPATIENT)
Dept: FAMILY MEDICINE | Facility: CLINIC | Age: 77
End: 2022-05-17

## 2022-05-17 DIAGNOSIS — Z00.00 ROUTINE GENERAL MEDICAL EXAMINATION AT A HEALTH CARE FACILITY: Primary | ICD-10-CM

## 2022-05-17 DIAGNOSIS — E03.9 ACQUIRED HYPOTHYROIDISM: ICD-10-CM

## 2022-05-17 DIAGNOSIS — E78.00 HYPERCHOLESTEREMIA: ICD-10-CM

## 2022-05-17 NOTE — TELEPHONE ENCOUNTER
----- Message from Laney Kitchen sent at 5/17/2022 11:04 AM CDT -----  Pt wants to get his blood work done tomorrow. Please send lab orders to [facility]. Please advise. Pt #[phone], [phone]

## 2022-05-19 LAB
ALBUMIN SERPL-MCNC: 4.3 G/DL (ref 3.7–4.7)
ALBUMIN/CREAT UR: <17 MG/G CREAT (ref 0–29)
ALBUMIN/GLOB SERPL: 1.4 {RATIO} (ref 1.2–2.2)
ALP SERPL-CCNC: 84 IU/L (ref 44–121)
ALT SERPL-CCNC: 23 IU/L (ref 0–44)
APPEARANCE UR: CLEAR
AST SERPL-CCNC: 17 IU/L (ref 0–40)
BACTERIA #/AREA URNS HPF: NORMAL /[HPF]
BASOPHILS # BLD AUTO: 0.1 X10E3/UL (ref 0–0.2)
BASOPHILS NFR BLD AUTO: 1 %
BILIRUB SERPL-MCNC: 0.5 MG/DL (ref 0–1.2)
BILIRUB UR QL STRIP: NEGATIVE
BUN SERPL-MCNC: 18 MG/DL (ref 8–27)
BUN/CREAT SERPL: 20 (ref 10–24)
CALCIUM SERPL-MCNC: 9.8 MG/DL (ref 8.6–10.2)
CASTS URNS QL MICRO: NORMAL /LPF
CHLORIDE SERPL-SCNC: 103 MMOL/L (ref 96–106)
CHOLEST SERPL-MCNC: 150 MG/DL (ref 100–199)
CO2 SERPL-SCNC: 21 MMOL/L (ref 20–29)
COLOR UR: YELLOW
CREAT SERPL-MCNC: 0.89 MG/DL (ref 0.76–1.27)
CREAT UR-MCNC: 17.3 MG/DL
EOSINOPHIL # BLD AUTO: 0.2 X10E3/UL (ref 0–0.4)
EOSINOPHIL NFR BLD AUTO: 4 %
EPI CELLS #/AREA URNS HPF: NORMAL /HPF (ref 0–10)
ERYTHROCYTE [DISTWIDTH] IN BLOOD BY AUTOMATED COUNT: 12.5 % (ref 11.6–15.4)
EST. GFR  (NO RACE VARIABLE): 89 ML/MIN/1.73
GLOBULIN SER CALC-MCNC: 3.1 G/DL (ref 1.5–4.5)
GLUCOSE SERPL-MCNC: 103 MG/DL (ref 65–99)
GLUCOSE UR QL STRIP: NEGATIVE
HCT VFR BLD AUTO: 45.9 % (ref 37.5–51)
HDLC SERPL-MCNC: 41 MG/DL
HGB BLD-MCNC: 15 G/DL (ref 13–17.7)
HGB UR QL STRIP: NEGATIVE
IMM GRANULOCYTES # BLD AUTO: 0 X10E3/UL (ref 0–0.1)
IMM GRANULOCYTES NFR BLD AUTO: 0 %
KETONES UR QL STRIP: NEGATIVE
LDLC SERPL CALC-MCNC: 90 MG/DL (ref 0–99)
LEUKOCYTE ESTERASE UR QL STRIP: NEGATIVE
LYMPHOCYTES # BLD AUTO: 2.4 X10E3/UL (ref 0.7–3.1)
LYMPHOCYTES NFR BLD AUTO: 41 %
MCH RBC QN AUTO: 30.7 PG (ref 26.6–33)
MCHC RBC AUTO-ENTMCNC: 32.7 G/DL (ref 31.5–35.7)
MCV RBC AUTO: 94 FL (ref 79–97)
MICRO URNS: NORMAL
MICRO URNS: NORMAL
MICROALBUMIN UR-MCNC: <3 UG/ML
MONOCYTES # BLD AUTO: 0.6 X10E3/UL (ref 0.1–0.9)
MONOCYTES NFR BLD AUTO: 11 %
NEUTROPHILS # BLD AUTO: 2.5 X10E3/UL (ref 1.4–7)
NEUTROPHILS NFR BLD AUTO: 43 %
NITRITE UR QL STRIP: NEGATIVE
PH UR STRIP: 6.5 [PH] (ref 5–7.5)
PLATELET # BLD AUTO: 342 X10E3/UL (ref 150–450)
POTASSIUM SERPL-SCNC: 4.6 MMOL/L (ref 3.5–5.2)
PROT SERPL-MCNC: 7.4 G/DL (ref 6–8.5)
PROT UR QL STRIP: NEGATIVE
RBC # BLD AUTO: 4.89 X10E6/UL (ref 4.14–5.8)
RBC #/AREA URNS HPF: NORMAL /HPF (ref 0–2)
SODIUM SERPL-SCNC: 141 MMOL/L (ref 134–144)
SP GR UR STRIP: 1.01 (ref 1–1.03)
T4 FREE SERPL-MCNC: 0.86 NG/DL (ref 0.82–1.77)
TRIGL SERPL-MCNC: 101 MG/DL (ref 0–149)
TSH SERPL DL<=0.005 MIU/L-ACNC: 5.88 UIU/ML (ref 0.45–4.5)
URINALYSIS REFLEX: NORMAL
UROBILINOGEN UR STRIP-MCNC: 0.2 MG/DL (ref 0.2–1)
VLDLC SERPL CALC-MCNC: 19 MG/DL (ref 5–40)
WBC # BLD AUTO: 5.7 X10E3/UL (ref 3.4–10.8)
WBC #/AREA URNS HPF: NORMAL /HPF (ref 0–5)

## 2022-05-23 ENCOUNTER — OFFICE VISIT (OUTPATIENT)
Dept: FAMILY MEDICINE | Facility: CLINIC | Age: 77
End: 2022-05-23
Payer: MEDICARE

## 2022-05-23 VITALS
HEART RATE: 75 BPM | WEIGHT: 193 LBS | HEIGHT: 68 IN | BODY MASS INDEX: 29.25 KG/M2 | DIASTOLIC BLOOD PRESSURE: 74 MMHG | SYSTOLIC BLOOD PRESSURE: 130 MMHG

## 2022-05-23 DIAGNOSIS — E03.9 ACQUIRED HYPOTHYROIDISM: ICD-10-CM

## 2022-05-23 DIAGNOSIS — Z12.5 SPECIAL SCREENING FOR MALIGNANT NEOPLASM OF PROSTATE: ICD-10-CM

## 2022-05-23 DIAGNOSIS — E78.00 HYPERCHOLESTEREMIA: ICD-10-CM

## 2022-05-23 DIAGNOSIS — I63.9 CEREBRAL INFARCTION, UNSPECIFIED MECHANISM: Primary | ICD-10-CM

## 2022-05-23 DIAGNOSIS — M17.0 PRIMARY OSTEOARTHRITIS OF BOTH KNEES: ICD-10-CM

## 2022-05-23 DIAGNOSIS — Z85.46 HISTORY OF PROSTATE CANCER: ICD-10-CM

## 2022-05-23 PROCEDURE — 3075F SYST BP GE 130 - 139MM HG: CPT | Mod: CPTII,S$GLB,, | Performed by: FAMILY MEDICINE

## 2022-05-23 PROCEDURE — 99214 PR OFFICE/OUTPT VISIT, EST, LEVL IV, 30-39 MIN: ICD-10-PCS | Mod: S$GLB,,, | Performed by: FAMILY MEDICINE

## 2022-05-23 PROCEDURE — 1159F MED LIST DOCD IN RCRD: CPT | Mod: CPTII,S$GLB,, | Performed by: FAMILY MEDICINE

## 2022-05-23 PROCEDURE — 3078F PR MOST RECENT DIASTOLIC BLOOD PRESSURE < 80 MM HG: ICD-10-PCS | Mod: CPTII,S$GLB,, | Performed by: FAMILY MEDICINE

## 2022-05-23 PROCEDURE — 3078F DIAST BP <80 MM HG: CPT | Mod: CPTII,S$GLB,, | Performed by: FAMILY MEDICINE

## 2022-05-23 PROCEDURE — 3075F PR MOST RECENT SYSTOLIC BLOOD PRESS GE 130-139MM HG: ICD-10-PCS | Mod: CPTII,S$GLB,, | Performed by: FAMILY MEDICINE

## 2022-05-23 PROCEDURE — 99214 OFFICE O/P EST MOD 30 MIN: CPT | Mod: S$GLB,,, | Performed by: FAMILY MEDICINE

## 2022-05-23 PROCEDURE — 1159F PR MEDICATION LIST DOCUMENTED IN MEDICAL RECORD: ICD-10-PCS | Mod: CPTII,S$GLB,, | Performed by: FAMILY MEDICINE

## 2022-05-23 RX ORDER — ATORVASTATIN CALCIUM 80 MG/1
80 TABLET, FILM COATED ORAL DAILY
Qty: 90 TABLET | Refills: 1 | Status: SHIPPED | OUTPATIENT
Start: 2022-05-23

## 2022-05-23 RX ORDER — LEVOTHYROXINE SODIUM 50 UG/1
50 TABLET ORAL
Qty: 90 TABLET | Refills: 1 | Status: SHIPPED | OUTPATIENT
Start: 2022-05-23

## 2022-05-23 RX ORDER — HYDROCODONE BITARTRATE AND ACETAMINOPHEN 5; 325 MG/1; MG/1
1 TABLET ORAL EVERY 12 HOURS PRN
Qty: 60 TABLET | Refills: 0 | Status: SHIPPED | OUTPATIENT
Start: 2022-05-23

## 2022-05-23 NOTE — PROGRESS NOTES
SUBJECTIVE:    Patient ID: Charles Perez is a 76 y.o. male.    Chief Complaint: Follow-up (Brought bottles // discuss about Lab-results // PNA shot //abc)    76-year-old male here for six-month checkup.  He is a retired .  He loves to work in the [employer] and garden also closed hunting deer and fall.  He always seems to be busy and working around the house.    2020-colonoscopy with Dr. Fermin-RTC 3 years.    Prostate cancer status post radiation treatments, in remission.    He has gained 2 lb recently      Telephone on 05/17/2022   Component Date Value Ref Range Status    WBC 05/18/2022 5.7  3.4 - 10.8 x10E3/uL Final    RBC 05/18/2022 4.89  4.14 - 5.80 x10E6/uL Final    Hemoglobin 05/18/2022 15.0  13.0 - 17.7 g/dL Final    Hematocrit 05/18/2022 45.9  37.5 - 51.0 % Final    MCV 05/18/2022 94  79 - 97 fL Final    MCH 05/18/2022 30.7  26.6 - 33.0 pg Final    MCHC 05/18/2022 32.7  31.5 - 35.7 g/dL Final    RDW 05/18/2022 12.5  11.6 - 15.4 % Final    Platelets 05/18/2022 342  150 - 450 x10E3/uL Final    Neutrophils 05/18/2022 43  Not Estab. % Final    Lymphs 05/18/2022 41  Not Estab. % Final    Monocytes 05/18/2022 11  Not Estab. % Final    Eos 05/18/2022 4  Not Estab. % Final    Basos 05/18/2022 1  Not Estab. % Final    Neutrophils (Absolute) 05/18/2022 2.5  1.4 - 7.0 x10E3/uL Final    Lymphs (Absolute) 05/18/2022 2.4  0.7 - 3.1 x10E3/uL Final    Monocytes(Absolute) 05/18/2022 0.6  0.1 - 0.9 x10E3/uL Final    Eos (Absolute) 05/18/2022 0.2  0.0 - 0.4 x10E3/uL Final    Baso (Absolute) 05/18/2022 0.1  0.0 - 0.2 x10E3/uL Final    Immature Granulocytes 05/18/2022 0  Not Estab. % Final    Immature Grans (Abs) 05/18/2022 0.0  0.0 - 0.1 x10E3/uL Final    Glucose 05/18/2022 103 (A) 65 - 99 mg/dL Final    BUN 05/18/2022 18  8 - 27 mg/dL Final    Creatinine 05/18/2022 0.89  0.76 - 1.27 mg/dL Final    eGFR no Race variable 05/18/2022 89  >59 mL/min/1.73 Final    BUN/Creatinine Ratio  05/18/2022 20  10 - 24 Final    Sodium 05/18/2022 141  134 - 144 mmol/L Final    Potassium 05/18/2022 4.6  3.5 - 5.2 mmol/L Final    Chloride 05/18/2022 103  96 - 106 mmol/L Final    CO2 05/18/2022 21  20 - 29 mmol/L Final    Calcium 05/18/2022 9.8  8.6 - 10.2 mg/dL Final    Protein, Total 05/18/2022 7.4  6.0 - 8.5 g/dL Final    Albumin 05/18/2022 4.3  3.7 - 4.7 g/dL Final    Globulin, Total 05/18/2022 3.1  1.5 - 4.5 g/dL Final    Albumin/Globulin Ratio 05/18/2022 1.4  1.2 - 2.2 Final    Total Bilirubin 05/18/2022 0.5  0.0 - 1.2 mg/dL Final    Alkaline Phosphatase 05/18/2022 84  44 - 121 IU/L Final    AST 05/18/2022 17  0 - 40 IU/L Final    ALT 05/18/2022 23  0 - 44 IU/L Final    Cholesterol 05/18/2022 150  100 - 199 mg/dL Final    Triglycerides 05/18/2022 101  0 - 149 mg/dL Final    HDL 05/18/2022 41  >39 mg/dL Final    VLDL Cholesterol Frederic 05/18/2022 19  5 - 40 mg/dL Final    LDL Calculated 05/18/2022 90  0 - 99 mg/dL Final    Creatinine, Urine 05/18/2022 17.3  Not Estab. mg/dL Final    Microalb, Ur 05/18/2022 <3.0  Not Estab. ug/mL Final    Microalb/Crt. Ratio 05/18/2022 <17  0 - 29 mg/g creat Final    Specific Gravity, UA 05/18/2022 1.006  1.005 - 1.030 Final    pH, UA 05/18/2022 6.5  5.0 - 7.5 Final    Color, UA 05/18/2022 Yellow  Yellow Final    Clarity, UA 05/18/2022 Clear  Clear Final    Leukocytes, UA 05/18/2022 Negative  Negative Final    Protein, UA 05/18/2022 Negative  Negative/Trace Final    Glucose, UA 05/18/2022 Negative  Negative Final    Ketones, UA 05/18/2022 Negative  Negative Final    Occult Blood UA 05/18/2022 Negative  Negative Final    Bilirubin, UA 05/18/2022 Negative  Negative Final    Urobilinogen, UA 05/18/2022 0.2  0.2 - 1.0 mg/dL Final    Nitrite, UA 05/18/2022 Negative  Negative Final    Microscopic Examination 05/18/2022 Comment   Final    Microscopic Examination 05/18/2022 See below:   Final    Urinalysis Reflex 05/18/2022 Comment   Final    TSH  2022 5.880 (A) 0.450 - 4.500 uIU/mL Final    WBC, UA 2022 None seen  0 - 5 /hpf Final    RBC, UA 2022 None seen  0 - 2 /hpf Final    Epithelial Cells (non renal) 2022 None seen  0 - 10 /hpf Final    Casts 2022 None seen  None seen /lpf Final    Bacteria, UA 2022 None seen  None seen/Few Final    T4, Free (Direct) 2022 0.86  0.82 - 1.77 ng/dL Final       Past Medical History:   Diagnosis Date    Cancer 2013    melanoma left forearm, Dr Pancho Gonsalez CVA (cerebral infarction) 2011    Hypercholesteremia     Hyperlipemia     Melanoma     left arm    Prostate cancer     Thyroid disease      Social History     Socioeconomic History    Marital status:     Number of children: 2   Occupational History    Occupation:    Tobacco Use    Smoking status: Former Smoker     Types: Cigarettes     Quit date: 1968     Years since quittin.4    Smokeless tobacco: Current User     Types: Snuff   Substance and Sexual Activity    Alcohol use: Yes     Alcohol/week: 2.0 standard drinks     Types: 2 Cans of beer per week     Comment: Daily    Drug use: No     Past Surgical History:   Procedure Laterality Date    LYMPHADENECTOMY      left axillary-Dr. Steiner     Family History   Problem Relation Age of Onset    Hypertension Mother     Heart disease Father     Hypertension Father     Heart disease Brother     Heart disease Sister     Cancer Paternal Grandfather         prostate       Review of patient's allergies indicates:   Allergen Reactions    No known drug allergies        Current Outpatient Medications:     aspirin (ECOTRIN) 81 MG EC tablet, Take 81 mg by mouth once daily., Disp: , Rfl:     benzocaine-resorcinoL (VAGISIL) 5-2 % vaginal cream, Place vaginally nightly. For itch of shingles on back and chest, Disp: , Rfl:     CALCIUM CITRATE/VITAMIN D3 (CALCIUM CITRATE + D ORAL), Take 3 tablets by mouth once daily., Disp: , Rfl:      cyanocobalamin, vitamin B-12, 5,000 mcg TbDL, Take 1 tablet by mouth once a week., Disp: , Rfl:     IRON/VITAMIN B COMPLEX (GERITOL ORAL), Take 1 capsule by mouth nightly., Disp: , Rfl:     LIDOcaine HCL 4% (XYLOCAINE) 4 % (40 mg/mL) external solution, Apply 4 mLs topically as needed., Disp: , Rfl:     MAGNESIUM ORAL, Take 2 tablets by mouth once daily. , Disp: , Rfl:     traMADoL (ULTRAM) 50 mg tablet, Take 1 tablet (50 mg total) by mouth every 12 (twelve) hours as needed for Pain., Disp: 50 tablet, Rfl: 0    UBIDECARENONE (COQ-10 ORAL), Take 1 tablet by mouth once daily., Disp: , Rfl:     atorvastatin (LIPITOR) 80 MG tablet, Take 1 tablet (80 mg total) by mouth once daily., Disp: 90 tablet, Rfl: 1    gabapentin (NEURONTIN) 600 MG tablet, Take 1 tablet (600 mg total) by mouth 3 (three) times daily., Disp: 90 tablet, Rfl: 4    HYDROcodone-acetaminophen (NORCO) 5-325 mg per tablet, Take 1 tablet by mouth every 12 (twelve) hours as needed for Pain., Disp: 60 tablet, Rfl: 0    levothyroxine (SYNTHROID) 50 MCG tablet, Take 1 tablet (50 mcg total) by mouth before breakfast., Disp: 90 tablet, Rfl: 1    sildenafil (VIAGRA) 100 MG tablet, Take 1 tablet (100 mg total) by mouth as needed for Erectile Dysfunction., Disp: 10 tablet, Rfl: 11    valACYclovir (VALTREX) 1000 MG tablet, Take 1 tablet (1,000 mg total) by mouth 3 (three) times daily., Disp: 90 tablet, Rfl: 11    Review of Systems   Constitutional: Negative for appetite change, chills, fatigue, fever and unexpected weight change.   HENT: Negative for congestion, ear pain and trouble swallowing.    Eyes: Negative for pain, discharge and visual disturbance.   Respiratory: Negative for apnea, cough, shortness of breath and wheezing.    Cardiovascular: Negative for chest pain and leg swelling.   Gastrointestinal: Negative for abdominal pain, blood in stool, constipation, diarrhea, nausea and vomiting.   Endocrine: Negative for cold intolerance, heat intolerance  "and polydipsia.   Genitourinary: Negative for dysuria, hematuria, testicular pain and urgency.        Voids  Well, nocturia  1-2 x  night   Musculoskeletal: Positive for back pain. Negative for gait problem, joint swelling and myalgias.   Neurological: Negative for dizziness, seizures and numbness.   Psychiatric/Behavioral: Negative for agitation, behavioral problems and hallucinations. The patient is not nervous/anxious.           Objective:      Vitals:    05/23/22 1015   BP: 130/74   Pulse: 75   Weight: 87.5 kg (193 lb)   Height: 5' 8" (1.727 m)     Physical Exam  Vitals and nursing note reviewed.   Constitutional:       General: He is not in acute distress.     Appearance: Normal appearance. He is well-developed. He is not toxic-appearing.   HENT:      Head: Normocephalic and atraumatic.      Right Ear: Tympanic membrane and external ear normal.      Left Ear: Tympanic membrane and external ear normal.      Nose: Nose normal.      Mouth/Throat:      Pharynx: Oropharynx is clear.   Eyes:      Pupils: Pupils are equal, round, and reactive to light.   Neck:      Thyroid: No thyromegaly.      Vascular: No carotid bruit.   Cardiovascular:      Rate and Rhythm: Normal rate and regular rhythm.      Heart sounds: Normal heart sounds. No murmur heard.  Pulmonary:      Effort: Pulmonary effort is normal.      Breath sounds: Normal breath sounds. No wheezing or rales.   Abdominal:      General: Bowel sounds are normal. There is no distension.      Palpations: Abdomen is soft.      Tenderness: There is no abdominal tenderness.   Musculoskeletal:         General: No tenderness or deformity. Normal range of motion.      Cervical back: Normal range of motion and neck supple.      Lumbar back: Normal. No spasms.      Comments: Bends 90 degrees at  Waist, shoulders and knees have good range of motion.  No peripheral edema noted.   Lymphadenopathy:      Cervical: No cervical adenopathy.   Skin:     General: Skin is warm and dry. "      Findings: No rash.   Neurological:      Mental Status: He is alert and oriented to person, place, and time.      Cranial Nerves: No cranial nerve deficit.      Coordination: Coordination normal.   Psychiatric:         Behavior: Behavior normal.         Thought Content: Thought content normal.         Judgment: Judgment normal.           Assessment:       1. Cerebral infarction, unspecified mechanism    2. Primary osteoarthritis of both knees    3. Hypercholesteremia    4. Acquired hypothyroidism    5. History of prostate cancer    6. Special screening for malignant neoplasm of prostate         Plan:       Cerebral infarction, unspecified mechanism  Very stable and active.  Continue aspirin daily.  Primary osteoarthritis of both knees  -     HYDROcodone-acetaminophen (NORCO) 5-325 mg per tablet; Take 1 tablet by mouth every 12 (twelve) hours as needed for Pain.  Dispense: 60 tablet; Refill: 0  Uses hydrocodone only rarely.  Hypercholesteremia  -     atorvastatin (LIPITOR) 80 MG tablet; Take 1 tablet (80 mg total) by mouth once daily.  Dispense: 90 tablet; Refill: 1  -     Comprehensive Metabolic Panel; Future; Expected date: 05/23/2022  -     Lipid Panel; Future; Expected date: 05/23/2022  Cholesterol 150 due for another set of spring lab work now.  Acquired hypothyroidism  -     levothyroxine (SYNTHROID) 50 MCG tablet; Take 1 tablet (50 mcg total) by mouth before breakfast.  Dispense: 90 tablet; Refill: 1  Refill levothyroxine  History of prostate cancer  Due for PSA test  Special screening for malignant neoplasm of prostate  -     PSA, Screening; Future; Expected date: 05/23/2022      Follow up in about 6 months (around 11/23/2022).        5/23/2022 David Viera

## 2022-06-11 ENCOUNTER — HOSPITAL ENCOUNTER (EMERGENCY)
Facility: HOSPITAL | Age: 77
Discharge: LEFT BEFORE DISCHARGE | End: 2022-06-11
Attending: EMERGENCY MEDICINE
Payer: MEDICARE

## 2022-06-11 VITALS
BODY MASS INDEX: 29.25 KG/M2 | OXYGEN SATURATION: 98 % | RESPIRATION RATE: 18 BRPM | HEIGHT: 68 IN | HEART RATE: 72 BPM | TEMPERATURE: 98 F | SYSTOLIC BLOOD PRESSURE: 128 MMHG | WEIGHT: 193 LBS | DIASTOLIC BLOOD PRESSURE: 82 MMHG

## 2022-06-11 DIAGNOSIS — R09.89 SYNCOPE WITHOUT OTHER CARDIOVASCULAR SYMPTOMS: ICD-10-CM

## 2022-06-11 DIAGNOSIS — R55 SYNCOPE WITHOUT OTHER CARDIOVASCULAR SYMPTOMS: ICD-10-CM

## 2022-06-11 LAB
ALBUMIN SERPL BCP-MCNC: 4.2 G/DL (ref 3.5–5.2)
ALP SERPL-CCNC: 61 U/L (ref 55–135)
ALT SERPL W/O P-5'-P-CCNC: 22 U/L (ref 10–44)
ANION GAP SERPL CALC-SCNC: 9 MMOL/L (ref 8–16)
AST SERPL-CCNC: 22 U/L (ref 10–40)
BASOPHILS # BLD AUTO: 0.03 K/UL (ref 0–0.2)
BASOPHILS NFR BLD: 0.5 % (ref 0–1.9)
BILIRUB SERPL-MCNC: 0.9 MG/DL (ref 0.1–1)
BUN SERPL-MCNC: 14 MG/DL (ref 8–23)
CALCIUM SERPL-MCNC: 9.3 MG/DL (ref 8.7–10.5)
CHLORIDE SERPL-SCNC: 105 MMOL/L (ref 95–110)
CK SERPL-CCNC: 80 U/L (ref 20–200)
CO2 SERPL-SCNC: 26 MMOL/L (ref 23–29)
CREAT SERPL-MCNC: 1 MG/DL (ref 0.5–1.4)
DIFFERENTIAL METHOD: NORMAL
EOSINOPHIL # BLD AUTO: 0.2 K/UL (ref 0–0.5)
EOSINOPHIL NFR BLD: 2.6 % (ref 0–8)
ERYTHROCYTE [DISTWIDTH] IN BLOOD BY AUTOMATED COUNT: 12.9 % (ref 11.5–14.5)
EST. GFR  (AFRICAN AMERICAN): >60 ML/MIN/1.73 M^2
EST. GFR  (NON AFRICAN AMERICAN): >60 ML/MIN/1.73 M^2
GLUCOSE SERPL-MCNC: 118 MG/DL (ref 70–110)
GLUCOSE SERPL-MCNC: 129 MG/DL (ref 70–110)
HCT VFR BLD AUTO: 46.3 % (ref 40–54)
HGB BLD-MCNC: 15.2 G/DL (ref 14–18)
IMM GRANULOCYTES # BLD AUTO: 0.02 K/UL (ref 0–0.04)
IMM GRANULOCYTES NFR BLD AUTO: 0.3 % (ref 0–0.5)
LYMPHOCYTES # BLD AUTO: 2 K/UL (ref 1–4.8)
LYMPHOCYTES NFR BLD: 30.2 % (ref 18–48)
MCH RBC QN AUTO: 30.6 PG (ref 27–31)
MCHC RBC AUTO-ENTMCNC: 32.8 G/DL (ref 32–36)
MCV RBC AUTO: 93 FL (ref 82–98)
MONOCYTES # BLD AUTO: 0.6 K/UL (ref 0.3–1)
MONOCYTES NFR BLD: 9.2 % (ref 4–15)
NEUTROPHILS # BLD AUTO: 3.7 K/UL (ref 1.8–7.7)
NEUTROPHILS NFR BLD: 57.2 % (ref 38–73)
NRBC BLD-RTO: 0 /100 WBC
PLATELET # BLD AUTO: 290 K/UL (ref 150–450)
PMV BLD AUTO: 9.7 FL (ref 9.2–12.9)
POTASSIUM SERPL-SCNC: 4 MMOL/L (ref 3.5–5.1)
PROT SERPL-MCNC: 7.9 G/DL (ref 6–8.4)
RBC # BLD AUTO: 4.97 M/UL (ref 4.6–6.2)
SODIUM SERPL-SCNC: 140 MMOL/L (ref 136–145)
TROPONIN I SERPL DL<=0.01 NG/ML-MCNC: <0.03 NG/ML
WBC # BLD AUTO: 6.52 K/UL (ref 3.9–12.7)

## 2022-06-11 PROCEDURE — 36415 COLL VENOUS BLD VENIPUNCTURE: CPT | Performed by: NURSE PRACTITIONER

## 2022-06-11 PROCEDURE — 93010 ELECTROCARDIOGRAM REPORT: CPT | Mod: ,,, | Performed by: INTERNAL MEDICINE

## 2022-06-11 PROCEDURE — 93005 ELECTROCARDIOGRAM TRACING: CPT | Performed by: INTERNAL MEDICINE

## 2022-06-11 PROCEDURE — 63600175 PHARM REV CODE 636 W HCPCS: Performed by: EMERGENCY MEDICINE

## 2022-06-11 PROCEDURE — 82962 GLUCOSE BLOOD TEST: CPT

## 2022-06-11 PROCEDURE — 96360 HYDRATION IV INFUSION INIT: CPT

## 2022-06-11 PROCEDURE — 36415 COLL VENOUS BLD VENIPUNCTURE: CPT | Performed by: EMERGENCY MEDICINE

## 2022-06-11 PROCEDURE — 80053 COMPREHEN METABOLIC PANEL: CPT | Performed by: NURSE PRACTITIONER

## 2022-06-11 PROCEDURE — 93010 EKG 12-LEAD: ICD-10-PCS | Mod: ,,, | Performed by: INTERNAL MEDICINE

## 2022-06-11 PROCEDURE — 85025 COMPLETE CBC W/AUTO DIFF WBC: CPT | Performed by: NURSE PRACTITIONER

## 2022-06-11 PROCEDURE — 84484 ASSAY OF TROPONIN QUANT: CPT | Performed by: NURSE PRACTITIONER

## 2022-06-11 PROCEDURE — 82550 ASSAY OF CK (CPK): CPT | Performed by: EMERGENCY MEDICINE

## 2022-06-11 PROCEDURE — 99285 EMERGENCY DEPT VISIT HI MDM: CPT | Mod: 25

## 2022-06-11 RX ADMIN — SODIUM CHLORIDE, SODIUM LACTATE, POTASSIUM CHLORIDE, AND CALCIUM CHLORIDE 1000 ML: .6; .31; .03; .02 INJECTION, SOLUTION INTRAVENOUS at 03:06

## 2022-06-11 NOTE — ED PROVIDER NOTES
Encounter Date: 2022       History     Chief Complaint   Patient presents with    Loss of Consciousness     PER SON, STATES SITTING IN CHAIR POURING COLD  WATER ON SELF TO COOL OFF WHEN PT STARTED TO STARE OFF INTO SPACE AND SLUMP OVER     76-year-old male presented emergency department after a syncopal episode.  Patient was sitting outside and hot weather at home and felt overheated and was pouring cold water on his hands and son noticed that he was staring and then became unresponsive and about to fall so he went and caught him.  Patient after a brief episode is awake and now is feeling back at baseline.  Patient did not hit the ground.  Denies any complaints at this time.  Denies chest pain or shortness of breath or abdominal pain or weakness or numbness.  Patient does not remember that episode.  Son said that episode lasted for about 15-20 seconds.  Denies any focal weakness or numbness        Review of patient's allergies indicates:   Allergen Reactions    No known drug allergies      Past Medical History:   Diagnosis Date    Cancer 2013    melanoma left forearm, Dr Pancho Gonsalez CVA (cerebral infarction) 2011    Hypercholesteremia     Hyperlipemia     Melanoma     left arm    Prostate cancer     Thyroid disease      Past Surgical History:   Procedure Laterality Date    LYMPHADENECTOMY      left axillary-Dr. Steiner     Family History   Problem Relation Age of Onset    Hypertension Mother     Heart disease Father     Hypertension Father     Heart disease Brother     Heart disease Sister     Cancer Paternal Grandfather         prostate     Social History     Tobacco Use    Smoking status: Former Smoker     Types: Cigarettes     Quit date: 1968     Years since quittin.4    Smokeless tobacco: Current User     Types: Snuff   Substance Use Topics    Alcohol use: Yes     Alcohol/week: 2.0 standard drinks     Types: 2 Cans of beer per week     Comment: Daily    Drug use: No      Review of Systems   Constitutional: Positive for fatigue.   HENT: Negative.    Eyes: Negative.    Respiratory: Negative.    Cardiovascular: Negative.    Gastrointestinal: Positive for nausea.   Endocrine: Negative.    Genitourinary: Negative.    Skin: Negative.    Allergic/Immunologic: Negative.    Neurological: Positive for syncope.   Hematological: Negative.    Psychiatric/Behavioral: Negative.    All other systems reviewed and are negative.      Physical Exam     Initial Vitals   BP Pulse Resp Temp SpO2   06/11/22 1514 06/11/22 1514 06/11/22 1514 06/11/22 1534 06/11/22 1514   139/78 66 18 98 °F (36.7 °C) 96 %      MAP       --                Physical Exam    Nursing note and vitals reviewed.  Constitutional: He appears well-developed and well-nourished.   HENT:   Head: Normocephalic and atraumatic.   Nose: Nose normal.   Eyes: Conjunctivae and EOM are normal.   Neck: No tracheal deviation present.   Normal range of motion.  Cardiovascular: Normal rate, regular rhythm, normal heart sounds and intact distal pulses. Exam reveals no friction rub.    No murmur heard.  Pulmonary/Chest: Breath sounds normal. No respiratory distress. He has no wheezes. He has no rales.   Abdominal: Abdomen is soft. He exhibits no distension. There is no abdominal tenderness.   Musculoskeletal:         General: Normal range of motion.      Cervical back: Normal range of motion.     Neurological: He is alert and oriented to person, place, and time. He has normal strength. No cranial nerve deficit or sensory deficit. GCS score is 15. GCS eye subscore is 4. GCS verbal subscore is 5. GCS motor subscore is 6.   Skin: Skin is warm and dry. Capillary refill takes less than 2 seconds.   Psychiatric: He has a normal mood and affect. Thought content normal.         ED Course   Procedures  Labs Reviewed   COMPREHENSIVE METABOLIC PANEL - Abnormal; Notable for the following components:       Result Value    Glucose 129 (*)     All other components  within normal limits   POCT GLUCOSE - Abnormal; Notable for the following components:    POC Glucose 118 (*)     All other components within normal limits   CBC W/ AUTO DIFFERENTIAL   CK   CK   TROPONIN I   TROPONIN I   POCT GLUCOSE MONITORING CONTINUOUS     EKG Readings: (Independently Interpreted)   Initial Reading: No STEMI. Rhythm: Normal Sinus Rhythm. Ectopy: No Ectopy. Conduction: Normal.     ECG Results          EKG and show to ED MD (In process)  Result time 06/11/22 15:57:54    In process by Interface, Lab In [facility] (06/11/22 15:57:54)                 Narrative:    Test Reason : R55,R09.89,    Vent. Rate : 067 BPM     Atrial Rate : 067 BPM     P-R Int : 182 ms          QRS Dur : 088 ms      QT Int : 378 ms       P-R-T Axes : 072 -15 034 degrees     QTc Int : 399 ms    Normal sinus rhythm  Nonspecific T wave abnormality  Abnormal ECG  When compared with ECG of 26-MAR-2014 11:10,  No significant change was found    Referred By: [email or web address]   SELF           Confirmed By:                             Imaging Results          CT Head Without Contrast (Final result)  Result time 06/11/22 16:07:29    Final result by Ezekiel Leon MD (06/11/22 16:07:29)                 Narrative:    CMS MANDATED QUALITY DATA - CT RADIATION 436    All CT scans at this facility utilize dose modulation, iterative reconstruction, and/or weight based dosing when appropriate to reduce radiation dose to as low as reasonably achievable.    CLINICAL HISTORY:  76 years (1945) Male Syncope, recurrent    TECHNIQUE:  CT HEAD WITHOUT IV CONTRAST. 109 images obtained. Axial CT of the brain without contrast using soft tissue and bone algorithm. Please note in the acute setting if there is a clinical concern for an acute stroke MRI would be more sensitive/specific for evaluation of ischemia.    COMPARISON:  PET/CT from April 12, 2019    FINDINGS:  No acute intracranial hemorrhage, hydrocephalus, herniation or midline shift and the basal  and suprasellar cisterns are patent. No acute skull fracture is identified.    Mild periventricular deep cerebral white matter low attenuation, a nonspecific finding in this age group which can be seen in any diffuse white matter process but which is most commonly associated with chronic microvascular ischemic disease. There are scattered atheromatous calcifications in the intracranial internal carotid arteries.    The orbits appear within normal limits noting likely bilateral lens replacement. External auditory canals are unremarkable. The visualized paranasal sinuses and mastoid air cells are essentially clear.    IMPRESSION:  No acute intracranial process                  .    Electronically signed by:  Ezekiel Leon MD  6/11/2022 4:07 PM CDT Workstation: [id number]                               Medications   lactated ringers bolus 1,000 mL (0 mLs Intravenous Stopped 6/11/22 1722)     Medical Decision Making:   Differential Diagnosis:   26-year-old male presented emergency department after a witnessed syncopal episode while he was sitting outside.  Patient believed he was overheated.  Patient now is asymptomatic and feeling better.  Patient gently hydrate.  CT of the head unremarkable for any acute findings.  Patient back at baseline at this time and currently asymptomatic and states he is feeling hungry and wants to go home.  Patient had similar episodes in the past.  Patient otherwise hemodynamically stable and workup unremarkable.  Patient does not want to be admitted to the hospital.  Admission offered to patient however patient said he wants to go home and will return if he changes his mind and will follow-up with his primary care provider.  Patient's wife at the bedside.  Patient leaving against medical advise with return precautions.  Clinical Tests:   Lab Tests: Reviewed  Radiological Study: Reviewed  Medical Tests: Reviewed                      Clinical Impression:   Final diagnoses:  [R55, R09.89]  Syncope without other cardiovascular symptoms          ED Disposition Condition    ALMA Ayala MD  06/11/22 [id number]

## 2022-06-11 NOTE — DISCHARGE INSTRUCTIONS
Drink lots of fluids.  Rest.  Return to emergency department for worsening symptoms or any problems.  Rest.  You are leaving against medical advise and you can return if you change your mind or if you feel ill.  As this could be heat related stay indoors for the next few days and hydrate yourself

## 2022-06-15 ENCOUNTER — OFFICE VISIT (OUTPATIENT)
Dept: FAMILY MEDICINE | Facility: CLINIC | Age: 77
End: 2022-06-15
Payer: MEDICARE

## 2022-06-15 VITALS
HEIGHT: 68 IN | SYSTOLIC BLOOD PRESSURE: 140 MMHG | HEART RATE: 72 BPM | BODY MASS INDEX: 29.4 KG/M2 | WEIGHT: 194 LBS | DIASTOLIC BLOOD PRESSURE: 92 MMHG

## 2022-06-15 DIAGNOSIS — M17.0 PRIMARY OSTEOARTHRITIS OF BOTH KNEES: ICD-10-CM

## 2022-06-15 DIAGNOSIS — E03.9 ACQUIRED HYPOTHYROIDISM: ICD-10-CM

## 2022-06-15 DIAGNOSIS — C61 PROSTATE CANCER: ICD-10-CM

## 2022-06-15 DIAGNOSIS — T67.5XXD HEAT EXHAUSTION, SUBSEQUENT ENCOUNTER: Primary | ICD-10-CM

## 2022-06-15 PROCEDURE — 3077F PR MOST RECENT SYSTOLIC BLOOD PRESSURE >= 140 MM HG: ICD-10-PCS | Mod: CPTII,S$GLB,, | Performed by: FAMILY MEDICINE

## 2022-06-15 PROCEDURE — 1159F PR MEDICATION LIST DOCUMENTED IN MEDICAL RECORD: ICD-10-PCS | Mod: CPTII,S$GLB,, | Performed by: FAMILY MEDICINE

## 2022-06-15 PROCEDURE — 3080F PR MOST RECENT DIASTOLIC BLOOD PRESSURE >= 90 MM HG: ICD-10-PCS | Mod: CPTII,S$GLB,, | Performed by: FAMILY MEDICINE

## 2022-06-15 PROCEDURE — 1159F MED LIST DOCD IN RCRD: CPT | Mod: CPTII,S$GLB,, | Performed by: FAMILY MEDICINE

## 2022-06-15 PROCEDURE — 99214 PR OFFICE/OUTPT VISIT, EST, LEVL IV, 30-39 MIN: ICD-10-PCS | Mod: S$GLB,,, | Performed by: FAMILY MEDICINE

## 2022-06-15 PROCEDURE — 3080F DIAST BP >= 90 MM HG: CPT | Mod: CPTII,S$GLB,, | Performed by: FAMILY MEDICINE

## 2022-06-15 PROCEDURE — 99214 OFFICE O/P EST MOD 30 MIN: CPT | Mod: S$GLB,,, | Performed by: FAMILY MEDICINE

## 2022-06-15 PROCEDURE — 3077F SYST BP >= 140 MM HG: CPT | Mod: CPTII,S$GLB,, | Performed by: FAMILY MEDICINE

## 2022-06-16 NOTE — PROGRESS NOTES
SUBJECTIVE:    Patient ID: Charles Perez is a 76 y.o. male.    Chief Complaint: Hospital Follow Up (No bottles // Syncopal episode // headache //abc)    76-year-old male was working out in the hot heat.  He did not have breakfast or lunch that day.  He was helping his son spread stand for a base of a swimming pool.  At 2:00 p.m. he had episode of near-syncope and had a blank stare and was not making sense when he talked.  He was brought to the emergency room and had full workup.    CT scans showed microvascular changes but no acute stroke or hemorrhage.  One cecal often was rehydrated he felt back to his normal baseline.    Patient's wife was worried that he may have had a small stroke but he never had slurred speech or weakness of any extremity.  Patient now agrees not to work out that length of time and heat without hydrating well or without eating any nutrition.  He is back working in the garden and has had no neuro deficits according to the wife.      Admission on 06/11/2022, Discharged on 06/11/2022   Component Date Value Ref Range Status    WBC 06/11/2022 6.52  3.90 - 12.70 K/uL Final    RBC 06/11/2022 4.97  4.60 - 6.20 M/uL Final    Hemoglobin 06/11/2022 15.2  14.0 - 18.0 g/dL Final    Hematocrit 06/11/2022 46.3  40.0 - 54.0 % Final    MCV 06/11/2022 93  82 - 98 fL Final    MCH 06/11/2022 30.6  27.0 - 31.0 pg Final    MCHC 06/11/2022 32.8  32.0 - 36.0 g/dL Final    RDW 06/11/2022 12.9  11.5 - 14.5 % Final    Platelets 06/11/2022 290  150 - 450 K/uL Final    MPV 06/11/2022 9.7  9.2 - 12.9 fL Final    Immature Granulocytes 06/11/2022 0.3  0.0 - 0.5 % Final    Gran # (ANC) 06/11/2022 3.7  1.8 - 7.7 K/uL Final    Immature Grans (Abs) 06/11/2022 0.02  0.00 - 0.04 K/uL Final    Lymph # 06/11/2022 2.0  1.0 - 4.8 K/uL Final    Mono # 06/11/2022 0.6  0.3 - 1.0 K/uL Final    Eos # 06/11/2022 0.2  0.0 - 0.5 K/uL Final    Baso # 06/11/2022 0.03  0.00 - 0.20 K/uL Final    nRBC 06/11/2022 0  0  /100 WBC Final    Gran % 06/11/2022 57.2  38.0 - 73.0 % Final    Lymph % 06/11/2022 30.2  18.0 - 48.0 % Final    Mono % 06/11/2022 9.2  4.0 - 15.0 % Final    Eosinophil % 06/11/2022 2.6  0.0 - 8.0 % Final    Basophil % 06/11/2022 0.5  0.0 - 1.9 % Final    Differential Method 06/11/2022 Automated   Final    Sodium 06/11/2022 140  136 - 145 mmol/L Final    Potassium 06/11/2022 4.0  3.5 - 5.1 mmol/L Final    Chloride 06/11/2022 105  95 - 110 mmol/L Final    CO2 06/11/2022 26  23 - 29 mmol/L Final    Glucose 06/11/2022 129 (A) 70 - 110 mg/dL Final    BUN 06/11/2022 14  8 - 23 mg/dL Final    Creatinine 06/11/2022 1.0  0.5 - 1.4 mg/dL Final    Calcium 06/11/2022 9.3  8.7 - 10.5 mg/dL Final    Total Protein 06/11/2022 7.9  6.0 - 8.4 g/dL Final    Albumin 06/11/2022 4.2  3.5 - 5.2 g/dL Final    Total Bilirubin 06/11/2022 0.9  0.1 - 1.0 mg/dL Final    Alkaline Phosphatase 06/11/2022 61  55 - 135 U/L Final    AST 06/11/2022 22  10 - 40 U/L Final    ALT 06/11/2022 22  10 - 44 U/L Final    Anion Gap 06/11/2022 9  8 - 16 mmol/L Final    eGFR if African American 06/11/2022 >60.0  >60 mL/min/1.73 m^2 Final    eGFR if non African American 06/11/2022 >60.0  >60 mL/min/1.73 m^2 Final    POC Glucose 06/11/2022 118 (A) 70 - 110 Final    CPK 06/11/2022 80  20 - 200 U/L Final    Troponin I 06/11/2022 <0.030  <=0.040 ng/mL Final   Telephone on 05/17/2022   Component Date Value Ref Range Status    WBC 05/18/2022 5.7  3.4 - 10.8 x10E3/uL Final    RBC 05/18/2022 4.89  4.14 - 5.80 x10E6/uL Final    Hemoglobin 05/18/2022 15.0  13.0 - 17.7 g/dL Final    Hematocrit 05/18/2022 45.9  37.5 - 51.0 % Final    MCV 05/18/2022 94  79 - 97 fL Final    MCH 05/18/2022 30.7  26.6 - 33.0 pg Final    MCHC 05/18/2022 32.7  31.5 - 35.7 g/dL Final    RDW 05/18/2022 12.5  11.6 - 15.4 % Final    Platelets 05/18/2022 342  150 - 450 x10E3/uL Final    Neutrophils 05/18/2022 43  Not Estab. % Final    Lymphs 05/18/2022 41  Not  Estab. % Final    Monocytes 05/18/2022 11  Not Estab. % Final    Eos 05/18/2022 4  Not Estab. % Final    Basos 05/18/2022 1  Not Estab. % Final    Neutrophils (Absolute) 05/18/2022 2.5  1.4 - 7.0 x10E3/uL Final    Lymphs (Absolute) 05/18/2022 2.4  0.7 - 3.1 x10E3/uL Final    Monocytes(Absolute) 05/18/2022 0.6  0.1 - 0.9 x10E3/uL Final    Eos (Absolute) 05/18/2022 0.2  0.0 - 0.4 x10E3/uL Final    Baso (Absolute) 05/18/2022 0.1  0.0 - 0.2 x10E3/uL Final    Immature Granulocytes 05/18/2022 0  Not Estab. % Final    Immature Grans (Abs) 05/18/2022 0.0  0.0 - 0.1 x10E3/uL Final    Glucose 05/18/2022 103 (A) 65 - 99 mg/dL Final    BUN 05/18/2022 18  8 - 27 mg/dL Final    Creatinine 05/18/2022 0.89  0.76 - 1.27 mg/dL Final    eGFR no Race variable 05/18/2022 89  >59 mL/min/1.73 Final    BUN/Creatinine Ratio 05/18/2022 20  10 - 24 Final    Sodium 05/18/2022 141  134 - 144 mmol/L Final    Potassium 05/18/2022 4.6  3.5 - 5.2 mmol/L Final    Chloride 05/18/2022 103  96 - 106 mmol/L Final    CO2 05/18/2022 21  20 - 29 mmol/L Final    Calcium 05/18/2022 9.8  8.6 - 10.2 mg/dL Final    Protein, Total 05/18/2022 7.4  6.0 - 8.5 g/dL Final    Albumin 05/18/2022 4.3  3.7 - 4.7 g/dL Final    Globulin, Total 05/18/2022 3.1  1.5 - 4.5 g/dL Final    Albumin/Globulin Ratio 05/18/2022 1.4  1.2 - 2.2 Final    Total Bilirubin 05/18/2022 0.5  0.0 - 1.2 mg/dL Final    Alkaline Phosphatase 05/18/2022 84  44 - 121 IU/L Final    AST 05/18/2022 17  0 - 40 IU/L Final    ALT 05/18/2022 23  0 - 44 IU/L Final    Cholesterol 05/18/2022 150  100 - 199 mg/dL Final    Triglycerides 05/18/2022 101  0 - 149 mg/dL Final    HDL 05/18/2022 41  >39 mg/dL Final    VLDL Cholesterol Frederic 05/18/2022 19  5 - 40 mg/dL Final    LDL Calculated 05/18/2022 90  0 - 99 mg/dL Final    Creatinine, Urine 05/18/2022 17.3  Not Estab. mg/dL Final    Microalb, Ur 05/18/2022 <3.0  Not Estab. ug/mL Final    Microalb/Crt. Ratio 05/18/2022 <17  0 - 29  mg/g creat Final    Specific Gravity, UA 2022 1.006  1.005 - 1.030 Final    pH, UA 2022 6.5  5.0 - 7.5 Final    Color, UA 2022 Yellow  Yellow Final    Clarity, UA 2022 Clear  Clear Final    Leukocytes, UA 2022 Negative  Negative Final    Protein, UA 2022 Negative  Negative/Trace Final    Glucose, UA 2022 Negative  Negative Final    Ketones, UA 2022 Negative  Negative Final    Occult Blood UA 2022 Negative  Negative Final    Bilirubin, UA 2022 Negative  Negative Final    Urobilinogen, UA 2022 0.2  0.2 - 1.0 mg/dL Final    Nitrite, UA 2022 Negative  Negative Final    Microscopic Examination 2022 Comment   Final    Microscopic Examination 2022 See below:   Final    Urinalysis Reflex 2022 Comment   Final    TSH 2022 5.880 (A) 0.450 - 4.500 uIU/mL Final    WBC, UA 2022 None seen  0 - 5 /hpf Final    RBC, UA 2022 None seen  0 - 2 /hpf Final    Epithelial Cells (non renal) 2022 None seen  0 - 10 /hpf Final    Casts 2022 None seen  None seen /lpf Final    Bacteria, UA 2022 None seen  None seen/Few Final    T4, Free (Direct) 2022 0.86  0.82 - 1.77 ng/dL Final       Past Medical History:   Diagnosis Date    Cancer 2013    melanoma left forearm, Dr Pancho Gonsalez CVA (cerebral infarction) 2011    Hypercholesteremia     Hyperlipemia     Melanoma     left arm    Prostate cancer     Thyroid disease      Social History     Socioeconomic History    Marital status:     Number of children: 2   Occupational History    Occupation:    Tobacco Use    Smoking status: Former Smoker     Types: Cigarettes     Quit date: 1968     Years since quittin.4    Smokeless tobacco: Current User     Types: Snuff   Substance and Sexual Activity    Alcohol use: Yes     Alcohol/week: 2.0 standard drinks     Types: 2 Cans of beer per week     Comment: Daily     Drug use: No     Past Surgical History:   Procedure Laterality Date    LYMPHADENECTOMY      left axillary-Dr. Steiner     Family History   Problem Relation Age of Onset    Hypertension Mother     Heart disease Father     Hypertension Father     Heart disease Brother     Heart disease Sister     Cancer Paternal Grandfather         prostate       Review of patient's allergies indicates:   Allergen Reactions    No known drug allergies        Current Outpatient Medications:     aspirin (ECOTRIN) 81 MG EC tablet, Take 81 mg by mouth once daily., Disp: , Rfl:     atorvastatin (LIPITOR) 80 MG tablet, Take 1 tablet (80 mg total) by mouth once daily., Disp: 90 tablet, Rfl: 1    benzocaine-resorcinoL (VAGISIL) 5-2 % vaginal cream, Place vaginally nightly. For itch of shingles on back and chest, Disp: , Rfl:     CALCIUM CITRATE/VITAMIN D3 (CALCIUM CITRATE + D ORAL), Take 3 tablets by mouth once daily., Disp: , Rfl:     cyanocobalamin, vitamin B-12, 5,000 mcg TbDL, Take 1 tablet by mouth once a week., Disp: , Rfl:     HYDROcodone-acetaminophen (NORCO) 5-325 mg per tablet, Take 1 tablet by mouth every 12 (twelve) hours as needed for Pain., Disp: 60 tablet, Rfl: 0    IRON/VITAMIN B COMPLEX (GERITOL ORAL), Take 1 capsule by mouth nightly., Disp: , Rfl:     levothyroxine (SYNTHROID) 50 MCG tablet, Take 1 tablet (50 mcg total) by mouth before breakfast., Disp: 90 tablet, Rfl: 1    LIDOcaine HCL 4% (XYLOCAINE) 4 % (40 mg/mL) external solution, Apply 4 mLs topically as needed., Disp: , Rfl:     MAGNESIUM ORAL, Take 2 tablets by mouth once daily. , Disp: , Rfl:     traMADoL (ULTRAM) 50 mg tablet, Take 1 tablet (50 mg total) by mouth every 12 (twelve) hours as needed for Pain., Disp: 50 tablet, Rfl: 0    UBIDECARENONE (COQ-10 ORAL), Take 1 tablet by mouth once daily., Disp: , Rfl:     gabapentin (NEURONTIN) 600 MG tablet, Take 1 tablet (600 mg total) by mouth 3 (three) times daily., Disp: 90 tablet, Rfl: 4     "sildenafil (VIAGRA) 100 MG tablet, Take 1 tablet (100 mg total) by mouth as needed for Erectile Dysfunction., Disp: 10 tablet, Rfl: 11    valACYclovir (VALTREX) 1000 MG tablet, Take 1 tablet (1,000 mg total) by mouth 3 (three) times daily., Disp: 90 tablet, Rfl: 11    Review of Systems   Constitutional: Negative for appetite change, chills, fatigue, fever and unexpected weight change.   HENT: Negative for congestion, ear pain and trouble swallowing.    Eyes: Negative for pain, discharge and visual disturbance.   Respiratory: Negative for apnea, cough, shortness of breath and wheezing.    Cardiovascular: Negative for chest pain and leg swelling.   Gastrointestinal: Negative for abdominal pain, blood in stool, constipation, diarrhea, nausea and vomiting.   Endocrine: Negative for cold intolerance, heat intolerance and polydipsia.   Genitourinary: Negative for dysuria, hematuria, testicular pain and urgency.   Musculoskeletal: Negative for gait problem, joint swelling and myalgias.   Neurological: Positive for dizziness. Negative for seizures and numbness.        Patient had a blank staring spell and almost passed out   Psychiatric/Behavioral: Negative for agitation, behavioral problems and hallucinations. The patient is not nervous/anxious.           Objective:      Vitals:    06/15/22 1045 06/15/22 1046   BP: (!) 140/90 (!) 140/92   Pulse: 72    Weight: 88 kg (194 lb)    Height: 5' 8" (1.727 m)      Physical Exam  Vitals and nursing note reviewed.   Constitutional:       General: He is not in acute distress.     Appearance: He is well-developed. He is not toxic-appearing.      Comments: Elderly male in no apparent distress today   HENT:      Head: Normocephalic and atraumatic.      Right Ear: Tympanic membrane and external ear normal.      Left Ear: Tympanic membrane and external ear normal.      Nose: Nose normal.      Mouth/Throat:      Pharynx: Oropharynx is clear.   Eyes:      Pupils: Pupils are equal, round, " and reactive to light.   Neck:      Thyroid: No thyromegaly.      Vascular: No carotid bruit.   Cardiovascular:      Rate and Rhythm: Normal rate and regular rhythm.      Heart sounds: Normal heart sounds. No murmur heard.  Pulmonary:      Effort: Pulmonary effort is normal.      Breath sounds: Normal breath sounds. No wheezing or rales.   Abdominal:      General: Bowel sounds are normal. There is no distension.      Palpations: Abdomen is soft.      Tenderness: There is no abdominal tenderness.   Musculoskeletal:         General: No tenderness or deformity. Normal range of motion.      Cervical back: Normal range of motion and neck supple.      Lumbar back: Normal. No spasms.      Comments: Bends 90 degrees at  waist normal range of motion of shoulders and knees.  No edema to lower extremities.   Lymphadenopathy:      Cervical: No cervical adenopathy.   Skin:     General: Skin is warm and dry.      Findings: No rash.   Neurological:      General: No focal deficit present.      Mental Status: He is alert and oriented to person, place, and time. Mental status is at baseline.      Cranial Nerves: No cranial nerve deficit.      Coordination: Coordination normal.      Gait: Gait normal.   Psychiatric:         Behavior: Behavior normal.         Thought Content: Thought content normal.         Judgment: Judgment normal.           Assessment:       1. Heat exhaustion, subsequent encounter    2. Prostate cancer    3. Acquired hypothyroidism    4. Primary osteoarthritis of both knees         Plan:       Heat exhaustion, subsequent encounter  Patient seems to be back at baseline at this time.  He was  on providing himself more hydration, more shade from the sun, more calories to eat prior to working hard.  Prostate cancer  In remission status post radiation treatment  Acquired hypothyroidism  Continue as is  Primary osteoarthritis of both knees  Stable at this time    No follow-ups on file.        6/16/2022 David ANTONIO  Maximiliano

## 2022-09-01 ENCOUNTER — OFFICE VISIT (OUTPATIENT)
Dept: FAMILY MEDICINE | Facility: CLINIC | Age: 77
End: 2022-09-01
Payer: MEDICARE

## 2022-09-01 VITALS
BODY MASS INDEX: 29.4 KG/M2 | WEIGHT: 194 LBS | DIASTOLIC BLOOD PRESSURE: 70 MMHG | SYSTOLIC BLOOD PRESSURE: 138 MMHG | HEIGHT: 68 IN

## 2022-09-01 DIAGNOSIS — S10.86XA INSECT BITE OF OTHER PART OF NECK, INITIAL ENCOUNTER: Primary | ICD-10-CM
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
## 2022-09-01 PROCEDURE — 3075F SYST BP GE 130 - 139MM HG: CPT | Mod: CPTII,S$GLB,, | Performed by: FAMILY MEDICINE

## 2022-09-01 PROCEDURE — 3078F DIAST BP <80 MM HG: CPT | Mod: CPTII,S$GLB,, | Performed by: FAMILY MEDICINE

## 2022-09-01 PROCEDURE — 1159F MED LIST DOCD IN RCRD: CPT | Mod: CPTII,S$GLB,, | Performed by: FAMILY MEDICINE

## 2022-09-01 PROCEDURE — 99213 PR OFFICE/OUTPT VISIT, EST, LEVL III, 20-29 MIN: ICD-10-PCS | Mod: S$GLB,,, | Performed by: FAMILY MEDICINE

## 2022-09-01 PROCEDURE — 3078F PR MOST RECENT DIASTOLIC BLOOD PRESSURE < 80 MM HG: ICD-10-PCS | Mod: CPTII,S$GLB,, | Performed by: FAMILY MEDICINE

## 2022-09-01 PROCEDURE — 3075F PR MOST RECENT SYSTOLIC BLOOD PRESS GE 130-139MM HG: ICD-10-PCS | Mod: CPTII,S$GLB,, | Performed by: FAMILY MEDICINE

## 2022-09-01 PROCEDURE — 1159F PR MEDICATION LIST DOCUMENTED IN MEDICAL RECORD: ICD-10-PCS | Mod: CPTII,S$GLB,, | Performed by: FAMILY MEDICINE

## 2022-09-01 PROCEDURE — 99213 OFFICE O/P EST LOW 20 MIN: CPT | Mod: S$GLB,,, | Performed by: FAMILY MEDICINE

## 2022-09-01 RX ORDER — DOXYCYCLINE 100 MG/1
100 CAPSULE ORAL 2 TIMES DAILY
Qty: 20 CAPSULE | Refills: 0 | Status: SHIPPED | OUTPATIENT
Start: 2022-09-01

## 2022-09-01 NOTE — PROGRESS NOTES
SUBJECTIVE:    Patient ID: Charles Perez is a 76 y.o. male.    Chief Complaint: Insect Bite (No bottles // abc )     Was bitten by a bug 2 days , felt a  sting  near  right jaw,suspects a  spider  bite, did not see a  wasp flying.Now right  jaw  is  swelling  and  itchy, no SOB , no resp  distress, swallowing           Admission on 06/11/2022, Discharged on 06/11/2022   Component Date Value Ref Range Status    WBC 06/11/2022 6.52  3.90 - 12.70 K/uL Final    RBC 06/11/2022 4.97  4.60 - 6.20 M/uL Final    Hemoglobin 06/11/2022 15.2  14.0 - 18.0 g/dL Final    Hematocrit 06/11/2022 46.3  40.0 - 54.0 % Final    MCV 06/11/2022 93  82 - 98 fL Final    MCH 06/11/2022 30.6  27.0 - 31.0 pg Final    MCHC 06/11/2022 32.8  32.0 - 36.0 g/dL Final    RDW 06/11/2022 12.9  11.5 - 14.5 % Final    Platelets 06/11/2022 290  150 - 450 K/uL Final    MPV 06/11/2022 9.7  9.2 - 12.9 fL Final    Immature Granulocytes 06/11/2022 0.3  0.0 - 0.5 % Final    Gran # (ANC) 06/11/2022 3.7  1.8 - 7.7 K/uL Final    Immature Grans (Abs) 06/11/2022 0.02  0.00 - 0.04 K/uL Final    Lymph # 06/11/2022 2.0  1.0 - 4.8 K/uL Final    Mono # 06/11/2022 0.6  0.3 - 1.0 K/uL Final    Eos # 06/11/2022 0.2  0.0 - 0.5 K/uL Final    Baso # 06/11/2022 0.03  0.00 - 0.20 K/uL Final    nRBC 06/11/2022 0  0 /100 WBC Final    Gran % 06/11/2022 57.2  38.0 - 73.0 % Final    Lymph % 06/11/2022 30.2  18.0 - 48.0 % Final    Mono % 06/11/2022 9.2  4.0 - 15.0 % Final    Eosinophil % 06/11/2022 2.6  0.0 - 8.0 % Final    Basophil % 06/11/2022 0.5  0.0 - 1.9 % Final    Differential Method 06/11/2022 Automated   Final    Sodium 06/11/2022 140  136 - 145 mmol/L Final    Potassium 06/11/2022 4.0  3.5 - 5.1 mmol/L Final    Chloride 06/11/2022 105  95 - 110 mmol/L Final    CO2 06/11/2022 26  23 - 29 mmol/L Final    Glucose 06/11/2022 129 (H)  70 - 110 mg/dL Final    BUN 06/11/2022 14  8 - 23 mg/dL Final    Creatinine 06/11/2022 1.0  0.5 - 1.4 mg/dL Final    Calcium 06/11/2022 9.3   8.7 - 10.5 mg/dL Final    Total Protein 06/11/2022 7.9  6.0 - 8.4 g/dL Final    Albumin 06/11/2022 4.2  3.5 - 5.2 g/dL Final    Total Bilirubin 06/11/2022 0.9  0.1 - 1.0 mg/dL Final    Alkaline Phosphatase 06/11/2022 61  55 - 135 U/L Final    AST 06/11/2022 22  10 - 40 U/L Final    ALT 06/11/2022 22  10 - 44 U/L Final    Anion Gap 06/11/2022 9  8 - 16 mmol/L Final    eGFR if African American 06/11/2022 >60.0  >60 mL/min/1.73 m^2 Final    eGFR if non African American 06/11/2022 >60.0  >60 mL/min/1.73 m^2 Final    POC Glucose 06/11/2022 118 (H)  70 - 110 Final    CPK 06/11/2022 80  20 - 200 U/L Final    Troponin I 06/11/2022 <0.030  <=0.040 ng/mL Final   Telephone on 05/17/2022   Component Date Value Ref Range Status    WBC 05/18/2022 5.7  3.4 - 10.8 x10E3/uL Final    RBC 05/18/2022 4.89  4.14 - 5.80 x10E6/uL Final    Hemoglobin 05/18/2022 15.0  13.0 - 17.7 g/dL Final    Hematocrit 05/18/2022 45.9  37.5 - 51.0 % Final    MCV 05/18/2022 94  79 - 97 fL Final    MCH 05/18/2022 30.7  26.6 - 33.0 pg Final    MCHC 05/18/2022 32.7  31.5 - 35.7 g/dL Final    RDW 05/18/2022 12.5  11.6 - 15.4 % Final    Platelets 05/18/2022 342  150 - 450 x10E3/uL Final    Neutrophils 05/18/2022 43  Not Estab. % Final    Lymphs 05/18/2022 41  Not Estab. % Final    Monocytes 05/18/2022 11  Not Estab. % Final    Eos 05/18/2022 4  Not Estab. % Final    Basos 05/18/2022 1  Not Estab. % Final    Neutrophils (Absolute) 05/18/2022 2.5  1.4 - 7.0 x10E3/uL Final    Lymphs (Absolute) 05/18/2022 2.4  0.7 - 3.1 x10E3/uL Final    Monocytes(Absolute) 05/18/2022 0.6  0.1 - 0.9 x10E3/uL Final    Eos (Absolute) 05/18/2022 0.2  0.0 - 0.4 x10E3/uL Final    Baso (Absolute) 05/18/2022 0.1  0.0 - 0.2 x10E3/uL Final    Immature Granulocytes 05/18/2022 0  Not Estab. % Final    Immature Grans (Abs) 05/18/2022 0.0  0.0 - 0.1 x10E3/uL Final    Glucose 05/18/2022 103 (H)  65 - 99 mg/dL Final    BUN 05/18/2022 18  8 - 27 mg/dL Final    Creatinine 05/18/2022 0.89  0.76 -  1.27 mg/dL Final    eGFR 05/18/2022 89  >59 mL/min/1.73 Final    BUN/Creatinine Ratio 05/18/2022 20  10 - 24 Final    Sodium 05/18/2022 141  134 - 144 mmol/L Final    Potassium 05/18/2022 4.6  3.5 - 5.2 mmol/L Final    Chloride 05/18/2022 103  96 - 106 mmol/L Final    CO2 05/18/2022 21  20 - 29 mmol/L Final    Calcium 05/18/2022 9.8  8.6 - 10.2 mg/dL Final    Protein, Total 05/18/2022 7.4  6.0 - 8.5 g/dL Final    Albumin 05/18/2022 4.3  3.7 - 4.7 g/dL Final    Globulin, Total 05/18/2022 3.1  1.5 - 4.5 g/dL Final    Albumin/Globulin Ratio 05/18/2022 1.4  1.2 - 2.2 Final    Total Bilirubin 05/18/2022 0.5  0.0 - 1.2 mg/dL Final    Alkaline Phosphatase 05/18/2022 84  44 - 121 IU/L Final    AST 05/18/2022 17  0 - 40 IU/L Final    ALT 05/18/2022 23  0 - 44 IU/L Final    Cholesterol 05/18/2022 150  100 - 199 mg/dL Final    Triglycerides 05/18/2022 101  0 - 149 mg/dL Final    HDL 05/18/2022 41  >39 mg/dL Final    VLDL Cholesterol Frederic 05/18/2022 19  5 - 40 mg/dL Final    LDL Calculated 05/18/2022 90  0 - 99 mg/dL Final    Creatinine, Urine 05/18/2022 17.3  Not Estab. mg/dL Final    Microalb, Ur 05/18/2022 <3.0  Not Estab. ug/mL Final    Microalb/Crt. Ratio 05/18/2022 <17  0 - 29 mg/g creat Final    Specific Gravity, UA 05/18/2022 1.006  1.005 - 1.030 Final    pH, UA 05/18/2022 6.5  5.0 - 7.5 Final    Color, UA 05/18/2022 Yellow  Yellow Final    Clarity, UA 05/18/2022 Clear  Clear Final    Leukocytes, UA 05/18/2022 Negative  Negative Final    Protein, UA 05/18/2022 Negative  Negative/Trace Final    Glucose, UA 05/18/2022 Negative  Negative Final    Ketones, UA 05/18/2022 Negative  Negative Final    Occult Blood UA 05/18/2022 Negative  Negative Final    Bilirubin, UA 05/18/2022 Negative  Negative Final    Urobilinogen, UA 05/18/2022 0.2  0.2 - 1.0 mg/dL Final    Nitrite, UA 05/18/2022 Negative  Negative Final    Microscopic Examination 05/18/2022 Comment   Final    Microscopic Examination 05/18/2022 See below:   Final     Urinalysis Reflex 2022 Comment   Final    TSH 2022 5.880 (H)  0.450 - 4.500 uIU/mL Final    WBC, UA 2022 None seen  0 - 5 /hpf Final    RBC, UA 2022 None seen  0 - 2 /hpf Final    Epithelial Cells (non renal) 2022 None seen  0 - 10 /hpf Final    Casts 2022 None seen  None seen /lpf Final    Bacteria, UA 2022 None seen  None seen/Few Final    T4, Free (Direct) 2022 0.86  0.82 - 1.77 ng/dL Final       Past Medical History:   Diagnosis Date    Cancer 2013    melanoma left forearm, Dr Pancho CARDENAS (cerebral infarction) 2011    Hypercholesteremia     Hyperlipemia     Melanoma     left arm    Prostate cancer     Thyroid disease      Social History     Socioeconomic History    Marital status:     Number of children: 2   Occupational History    Occupation:    Tobacco Use    Smoking status: Former     Types: Cigarettes     Quit date: 1968     Years since quittin.7    Smokeless tobacco: Current     Types: Snuff   Substance and Sexual Activity    Alcohol use: Yes     Alcohol/week: 2.0 standard drinks     Types: 2 Cans of beer per week     Comment: Daily    Drug use: No     Past Surgical History:   Procedure Laterality Date    LYMPHADENECTOMY      left axillary-Dr. Steiner     Family History   Problem Relation Age of Onset    Hypertension Mother     Heart disease Father     Hypertension Father     Heart disease Brother     Heart disease Sister     Cancer Paternal Grandfather         prostate       Review of patient's allergies indicates:   Allergen Reactions    No known drug allergies        Current Outpatient Medications:     aspirin (ECOTRIN) 81 MG EC tablet, Take 81 mg by mouth once daily., Disp: , Rfl:     atorvastatin (LIPITOR) 80 MG tablet, Take 1 tablet (80 mg total) by mouth once daily., Disp: 90 tablet, Rfl: 1    benzocaine-resorcinoL (VAGISIL) 5-2 % vaginal cream, Place vaginally nightly. For itch of shingles on back and chest,  "Disp: , Rfl:     CALCIUM CITRATE/VITAMIN D3 (CALCIUM CITRATE + D ORAL), Take 3 tablets by mouth once daily., Disp: , Rfl:     cyanocobalamin, vitamin B-12, 5,000 mcg TbDL, Take 1 tablet by mouth once a week., Disp: , Rfl:     HYDROcodone-acetaminophen (NORCO) 5-325 mg per tablet, Take 1 tablet by mouth every 12 (twelve) hours as needed for Pain., Disp: 60 tablet, Rfl: 0    IRON/VITAMIN B COMPLEX (GERITOL ORAL), Take 1 capsule by mouth nightly., Disp: , Rfl:     levothyroxine (SYNTHROID) 50 MCG tablet, Take 1 tablet (50 mcg total) by mouth before breakfast., Disp: 90 tablet, Rfl: 1    LIDOcaine HCL 4% (XYLOCAINE) 4 % (40 mg/mL) external solution, Apply 4 mLs topically as needed., Disp: , Rfl:     MAGNESIUM ORAL, Take 2 tablets by mouth once daily. , Disp: , Rfl:     traMADoL (ULTRAM) 50 mg tablet, Take 1 tablet (50 mg total) by mouth every 12 (twelve) hours as needed for Pain., Disp: 50 tablet, Rfl: 0    UBIDECARENONE (COQ-10 ORAL), Take 1 tablet by mouth once daily., Disp: , Rfl:     doxycycline (MONODOX) 100 MG capsule, Take 1 capsule (100 mg total) by mouth 2 (two) times daily., Disp: 20 capsule, Rfl: 0    gabapentin (NEURONTIN) 600 MG tablet, Take 1 tablet (600 mg total) by mouth 3 (three) times daily., Disp: 90 tablet, Rfl: 4    sildenafil (VIAGRA) 100 MG tablet, Take 1 tablet (100 mg total) by mouth as needed for Erectile Dysfunction., Disp: 10 tablet, Rfl: 11    valACYclovir (VALTREX) 1000 MG tablet, Take 1 tablet (1,000 mg total) by mouth 3 (three) times daily., Disp: 90 tablet, Rfl: 11    Review of Systems       Objective:      Vitals:    09/01/22 0731 09/01/22 0735 09/01/22 0759   BP: (!) 166/96 (!) 160/98 138/70   Weight: 88 kg (194 lb)     Height: 5' 8" (1.727 m)       Physical Exam  Constitutional:       General: He is not in acute distress.     Appearance: Normal appearance. He is not toxic-appearing.   HENT:      Right Ear: Tympanic membrane normal.      Left Ear: Tympanic membrane normal.      " Mouth/Throat:      Comments: Right jaw is  swollen and  pink, no abscess palp  Cardiovascular:      Rate and Rhythm: Regular rhythm.      Comments: Occas  ectopics  Skin:     General: Skin is warm.      Findings: Erythema (rt jawline swelling  and  redness) and rash present.         Assessment:       1. Insect bite of other part of neck, initial encounter         Plan:       Insect bite of other part of neck, initial encounter  Trial of  doxycycline 100 mg bid    Cont benadryl 50 mg  bid   Follow up if symptoms worsen or fail to improve.        9/1/2022 David Viera

## 2022-11-09 ENCOUNTER — TELEPHONE (OUTPATIENT)
Dept: FAMILY MEDICINE | Facility: CLINIC | Age: 77
End: 2022-11-09